# Patient Record
Sex: FEMALE | Race: WHITE | NOT HISPANIC OR LATINO | Employment: OTHER | URBAN - NONMETROPOLITAN AREA
[De-identification: names, ages, dates, MRNs, and addresses within clinical notes are randomized per-mention and may not be internally consistent; named-entity substitution may affect disease eponyms.]

---

## 2020-02-14 ENCOUNTER — HOSPITAL ENCOUNTER (OUTPATIENT)
Dept: MAMMOGRAPHY | Facility: HOSPITAL | Age: 65
Discharge: HOME | End: 2020-02-14
Admitting: FAMILY MEDICINE

## 2020-02-14 DIAGNOSIS — Z12.31 VISIT FOR SCREENING MAMMOGRAM: ICD-10-CM

## 2020-02-14 PROCEDURE — 77063 BREAST TOMOSYNTHESIS BI: CPT | Performed by: RADIOLOGY

## 2020-02-14 PROCEDURE — 77067 SCR MAMMO BI INCL CAD: CPT | Performed by: RADIOLOGY

## 2020-02-14 PROCEDURE — 77063 BREAST TOMOSYNTHESIS BI: CPT

## 2020-02-14 PROCEDURE — 77067 SCR MAMMO BI INCL CAD: CPT

## 2020-07-21 ENCOUNTER — TRANSCRIBE ORDERS (OUTPATIENT)
Dept: LAB | Facility: HOSPITAL | Age: 65
End: 2020-07-21

## 2020-07-21 DIAGNOSIS — Z01.818 PRE-OPERATIVE CLEARANCE: Primary | ICD-10-CM

## 2020-07-22 ENCOUNTER — LAB (OUTPATIENT)
Dept: LAB | Facility: HOSPITAL | Age: 65
End: 2020-07-22

## 2020-07-22 DIAGNOSIS — Z01.818 PRE-OPERATIVE CLEARANCE: ICD-10-CM

## 2020-07-22 PROCEDURE — C9803 HOPD COVID-19 SPEC COLLECT: HCPCS

## 2020-07-22 PROCEDURE — U0004 COV-19 TEST NON-CDC HGH THRU: HCPCS

## 2020-07-22 PROCEDURE — U0002 COVID-19 LAB TEST NON-CDC: HCPCS

## 2020-07-23 LAB
REF LAB TEST METHOD: NORMAL
SARS-COV-2 RNA RESP QL NAA+PROBE: NOT DETECTED

## 2021-03-01 ENCOUNTER — IMMUNIZATION (OUTPATIENT)
Dept: VACCINE CLINIC | Facility: HOSPITAL | Age: 66
End: 2021-03-01

## 2021-03-01 PROCEDURE — 0001A: CPT | Performed by: INTERNAL MEDICINE

## 2021-03-01 PROCEDURE — 91300 HC SARSCOV02 VAC 30MCG/0.3ML IM: CPT | Performed by: INTERNAL MEDICINE

## 2021-03-22 ENCOUNTER — IMMUNIZATION (OUTPATIENT)
Dept: VACCINE CLINIC | Facility: HOSPITAL | Age: 66
End: 2021-03-22

## 2021-03-22 PROCEDURE — 91300 HC SARSCOV02 VAC 30MCG/0.3ML IM: CPT | Performed by: INTERNAL MEDICINE

## 2021-03-22 PROCEDURE — 0002A: CPT | Performed by: INTERNAL MEDICINE

## 2021-09-14 ENCOUNTER — HOSPITAL ENCOUNTER (OUTPATIENT)
Dept: MAMMOGRAPHY | Facility: HOSPITAL | Age: 66
Discharge: HOME | End: 2021-09-14
Admitting: NURSE PRACTITIONER

## 2021-09-14 DIAGNOSIS — Z12.31 VISIT FOR SCREENING MAMMOGRAM: ICD-10-CM

## 2021-09-14 PROCEDURE — 77063 BREAST TOMOSYNTHESIS BI: CPT | Performed by: RADIOLOGY

## 2021-09-14 PROCEDURE — 77063 BREAST TOMOSYNTHESIS BI: CPT

## 2021-09-14 PROCEDURE — 77067 SCR MAMMO BI INCL CAD: CPT | Performed by: RADIOLOGY

## 2021-09-14 PROCEDURE — 77067 SCR MAMMO BI INCL CAD: CPT

## 2021-09-16 ENCOUNTER — HOSPITAL ENCOUNTER (OUTPATIENT)
Dept: MAMMOGRAPHY | Facility: HOSPITAL | Age: 66
Discharge: HOME | End: 2021-09-16
Admitting: RADIOLOGY

## 2021-09-16 DIAGNOSIS — R92.8 ABNORMAL MAMMOGRAM: ICD-10-CM

## 2021-09-16 PROCEDURE — 77065 DX MAMMO INCL CAD UNI: CPT | Performed by: RADIOLOGY

## 2021-09-16 PROCEDURE — 77065 DX MAMMO INCL CAD UNI: CPT

## 2021-09-22 ENCOUNTER — HOSPITAL ENCOUNTER (OUTPATIENT)
Dept: MAMMOGRAPHY | Facility: HOSPITAL | Age: 66
Discharge: HOME | End: 2021-09-22

## 2021-09-22 DIAGNOSIS — R92.8 ABNORMAL MAMMOGRAM: ICD-10-CM

## 2021-09-22 PROCEDURE — A4648 IMPLANTABLE TISSUE MARKER: HCPCS

## 2021-09-22 PROCEDURE — 19081 BX BREAST 1ST LESION STRTCTC: CPT | Performed by: RADIOLOGY

## 2021-09-22 PROCEDURE — 77065 DX MAMMO INCL CAD UNI: CPT | Performed by: RADIOLOGY

## 2021-09-24 LAB — LAB AP CASE REPORT: NORMAL

## 2021-09-27 ENCOUNTER — TELEPHONE (OUTPATIENT)
Dept: MAMMOGRAPHY | Facility: HOSPITAL | Age: 66
End: 2021-09-27

## 2021-09-27 NOTE — TELEPHONE ENCOUNTER
Pt given biopsy results and surgical consultation appt. Nurse navigator notified.    ----- Message from Elena Romero MD sent at 9/24/2021  1:53 PM EDT -----  PATHOLOGY:Ductal carcinoma in situ. Microcalcifications identified in association with ductal carcinoma in situ.The pathology result is concordant with the imaging findings.Recommend surgical consultation.The patient will be notified of the results and recommendations by our breast care nurse.

## 2021-10-01 ENCOUNTER — OFFICE VISIT (OUTPATIENT)
Dept: SURGERY | Facility: CLINIC | Age: 66
End: 2021-10-01

## 2021-10-01 VITALS
DIASTOLIC BLOOD PRESSURE: 62 MMHG | SYSTOLIC BLOOD PRESSURE: 118 MMHG | HEIGHT: 65 IN | HEART RATE: 60 BPM | WEIGHT: 141 LBS | BODY MASS INDEX: 23.49 KG/M2

## 2021-10-01 DIAGNOSIS — D05.12 BREAST NEOPLASM, TIS (DCIS), LEFT: Primary | ICD-10-CM

## 2021-10-01 PROCEDURE — 99204 OFFICE O/P NEW MOD 45 MIN: CPT | Performed by: SURGERY

## 2021-10-01 RX ORDER — AMLODIPINE BESYLATE 5 MG/1
5 TABLET ORAL DAILY
COMMUNITY
Start: 2021-08-03

## 2021-10-01 RX ORDER — CEFAZOLIN SODIUM 2 G/50ML
2 SOLUTION INTRAVENOUS ONCE
Status: CANCELLED | OUTPATIENT
Start: 2021-10-12 | End: 2021-10-01

## 2021-10-01 RX ORDER — OMEPRAZOLE 20 MG/1
20 CAPSULE, DELAYED RELEASE ORAL DAILY
COMMUNITY
End: 2023-01-09

## 2021-10-01 RX ORDER — CHLORAL HYDRATE 500 MG
CAPSULE ORAL
COMMUNITY

## 2021-10-01 RX ORDER — ERGOCALCIFEROL (VITAMIN D2) 10 MCG
400 TABLET ORAL DAILY
COMMUNITY

## 2021-10-01 NOTE — PROGRESS NOTES
"Subjective   Dodie Kelly is a 65 y.o. female here today for pathology review referred by Dr. Romero.    History of Present Illness  Ms. Kelly was seen in the office today for her new diagnosis of DCIS of the left breast.  Patient underwent a screening mammogram on 9/14/2021 which demonstrated left breast calcifications.  Diagnostic mammogram was performed on 9/16/2021.  Calcifications were felt to be indeterminant.  Biopsy was performed on 9/22/2021 which demonstrated cribriform DCIS, ER positive, [place] positive.  Patient denies a palpable mass or nipple discharge.  There is no prior history of breast biopsy or cyst aspiration.  There is no family history of breast or ovarian cancer.  The patient is postmenopausal and has never been on hormone replacement therapy.  Allergies   Allergen Reactions   • Bactrim [Sulfamethoxazole-Trimethoprim] Other (See Comments)     Passed out   • Lisinopril Cough   • Losartan Rash     Current Outpatient Medications   Medication Sig Dispense Refill   • amLODIPine (NORVASC) 5 MG tablet Take 5 mg by mouth Daily.     • Omega-3 Fatty Acids (fish oil) 1000 MG capsule capsule Take  by mouth Daily With Breakfast.     • omeprazole (priLOSEC) 20 MG capsule Take 20 mg by mouth Daily.     • Vitamin D, Cholecalciferol, (CHOLECALCIFEROL) 10 MCG (400 UNIT) tablet Take 400 Units by mouth Daily.       No current facility-administered medications for this visit.     Past Medical History:   Diagnosis Date   • Hypertension      History reviewed. No pertinent surgical history.    Pertinent Review of Systems:  Respiratory: no shortness of breath  Cardiovascular: no chest pain  Other pertinent:      Objective   /62 (BP Location: Left arm)   Pulse 60   Ht 165.1 cm (65\")   Wt 64 kg (141 lb)   BMI 23.46 kg/m²   Physical Exam  Well-developed well-nourished female no acute distress  Neck:  No supraclavicular adenopathy  Lungs:  Respiratory effort normal. Auscultation: Clear, without wheezes, " rhonchi, rales  Heart:  Regular rate and rhythm, without murmur, gallop, rub.  No pedal edema  Breasts: On visual inspection the breasts are symmetrical.  Examination of the right breast demonstrates no discrete mass, skin change, or axillary adenopathy.  Examination of the left breast demonstrates some bruising and a hematoma at the biopsy site.  The remainder of the left breast and axilla are unremarkable..       Procedures   Baseline L dex was performed and was -5.9    Results/Data:  Imaging: Mammogram reports and images from 9/14/2021, 9/16/2021 and 9/22/2021 were reviewed.  I agree with the assessment  Notes:   Lab:   Other: Pathology report was reviewed.  I agree with the assessment    Assessment/Plan   DCIS of the left breast, ER positive, [place] positive    I have asked pathology to quantify the size of the DCIS noted on biopsy  Proceed with left lumpectomy with wire localization         Discussion/Summary: It was discussed with the patient that invasive tumor may be identified at the time of lumpectomy which may alter further recommendations.  It was also discussed with the patient that she would likely need adjuvant radiation.  The role of hormonal therapy was also discussed.    Time spent:     Patient's Body mass index is 23.46 kg/m². indicating that she is within normal range (BMI 18.5-24.9). No BMI management plan needed..       Future Appointments   Date Time Provider Department Center   10/8/2021  9:30 AM PAT 3 [facility]         Please note that portions of this note were completed with a voice recognition program.

## 2021-10-01 NOTE — H&P
"Subjective   Dodie Kelly is a 65 y.o. female here today for pathology review referred by Dr. Romero.    History of Present Illness  Ms. Kelly was seen in the office today for her new diagnosis of DCIS of the left breast.  Patient underwent a screening mammogram on 9/14/2021 which demonstrated left breast calcifications.  Diagnostic mammogram was performed on 9/16/2021.  Calcifications were felt to be indeterminant.  Biopsy was performed on 9/22/2021 which demonstrated cribriform DCIS, ER positive, [place] positive.  Patient denies a palpable mass or nipple discharge.  There is no prior history of breast biopsy or cyst aspiration.  There is no family history of breast or ovarian cancer.  The patient is postmenopausal and has never been on hormone replacement therapy.  Allergies   Allergen Reactions   • Bactrim [Sulfamethoxazole-Trimethoprim] Other (See Comments)     Passed out   • Lisinopril Cough   • Losartan Rash     Current Outpatient Medications   Medication Sig Dispense Refill   • amLODIPine (NORVASC) 5 MG tablet Take 5 mg by mouth Daily.     • Omega-3 Fatty Acids (fish oil) 1000 MG capsule capsule Take  by mouth Daily With Breakfast.     • omeprazole (priLOSEC) 20 MG capsule Take 20 mg by mouth Daily.     • Vitamin D, Cholecalciferol, (CHOLECALCIFEROL) 10 MCG (400 UNIT) tablet Take 400 Units by mouth Daily.       No current facility-administered medications for this visit.     Past Medical History:   Diagnosis Date   • Hypertension      History reviewed. No pertinent surgical history.    Pertinent Review of Systems:  Respiratory: no shortness of breath  Cardiovascular: no chest pain  Other pertinent:      Objective   /62 (BP Location: Left arm)   Pulse 60   Ht 165.1 cm (65\")   Wt 64 kg (141 lb)   BMI 23.46 kg/m²   Physical Exam  Well-developed well-nourished female no acute distress  Neck:  No supraclavicular adenopathy  Lungs:  Respiratory effort normal. Auscultation: Clear, without wheezes, " rhonchi, rales  Heart:  Regular rate and rhythm, without murmur, gallop, rub.  No pedal edema  Breasts: On visual inspection the breasts are symmetrical.  Examination of the right breast demonstrates no discrete mass, skin change, or axillary adenopathy.  Examination of the left breast demonstrates some bruising and a hematoma at the biopsy site.  The remainder of the left breast and axilla are unremarkable..       Procedures   Baseline L dex was performed and was -5.9    Results/Data:  Imaging: Mammogram reports and images from 9/14/2021, 9/16/2021 and 9/22/2021 were reviewed.  I agree with the assessment  Notes:   Lab:   Other: Pathology report was reviewed.  I agree with the assessment    Assessment/Plan   DCIS of the left breast, ER positive, [place] positive    I have asked pathology to quantify the size of the DCIS noted on biopsy  Proceed with left lumpectomy with wire localization         Discussion/Summary: It was discussed with the patient that invasive tumor may be identified at the time of lumpectomy which may alter further recommendations.  It was also discussed with the patient that she would likely need adjuvant radiation.  The role of hormonal therapy was also discussed.    Time spent:     Patient's Body mass index is 23.46 kg/m². indicating that she is within normal range (BMI 18.5-24.9). No BMI management plan needed..       Future Appointments   Date Time Provider Department Center   10/8/2021  9:30 AM PAT 3 [facility]         Please note that portions of this note were completed with a voice recognition program.    This document has been electronically signed by Estefania ALMODOVAR MD on October 1, 2021 14:51 EDT

## 2021-10-05 DIAGNOSIS — Z01.818 PRE-OP TESTING: Primary | ICD-10-CM

## 2021-10-06 ENCOUNTER — TELEPHONE (OUTPATIENT)
Dept: SURGERY | Facility: CLINIC | Age: 66
End: 2021-10-06

## 2021-10-06 NOTE — DISCHARGE INSTRUCTIONS
10/12/21   ARRIVAL TIME PER DR TUTTLE OFFICE    TAKE the following medications the morning of surgery:  All heart or blood pressure medications    HOLD all diabetic medications the morning of surgery as ordered by physician.    Please discontinue all blood thinners and anticoagulants (except aspirin) prior to surgery as per your surgeon and cardiologist instructions.  Aspirin may be continued up to the day prior to surgery.     CHLORHEXIDINE CLOTHS GIVEN WITH INSTRUCTIONS AND FORM TO RETURN TO HOSPITAL, IF APPLICABLE.    General Instructions:  · Do not eat or drink after midnight: includes water, mints, or gum. You may brush your teeth.  Dental appliances that are removable must be taken out day of surgery.  · Do not smoke, chew tobacco, or drink alcohol.  · Bring medications in original bottles, any inhalers and if applicable your C-PAP/BI-PAP machine.  · Bring any papers given to you in the doctor's office.  · Wear clean comfortable clothes and socks.  · Do not wear contact lenses or make-up. Bring a case for your glasses if applicable.  · Bring crutches or walker if applicable.  · Leave all other valuables and jewelry at home.    If you were given a blood bank ID arm band remember to bring it with you the day of surgery.    Preventing a Surgical Site Infection:  Shower the night before surgery (unless instructed other wise) using a fresh bar of anti-bacterial soap (such as Dial) and clean washcloth. Dry with a clean towel and dress in clean clothing.  For 2 to 3 days before surgery, avoid shaving with a razor near where you will have surgery because the razor can irritate skin and make it easier to develop an infection. Ask your surgeon if you will be receiving antibiotics prior to surgery.  Make sure you, your family, and all healthcare providers clear their hands with soap and water or an alcohol-based hand  before caring for you or your wound.  If at all possible, quit smoking as many days before  surgery as you can.    Day of surgery:  Upon arrival, a Pre-op nurse and Anesthesiologist will review your health history, obtain vital signs, and answer questions you may have. The only belongings needed at this time will be your home medications and if applicable your C-PAP/BI-PAP machine. If you are staying overnight your family can leave the rest of your belongings in the car and bring them to your room later. A Pre-op nurse will start an IV and you may receive medication in preparation for surgery, including something to help you relax. Your family will be able to see you in the Pre-op area. While you are in surgery your family should notify the waiting room  if they leave the waiting room area and provide a contact phone number.    Please be aware that surgery does come with discomfort. We want to make every effort to control your discomfort so please discuss any uncontrolled symptoms with your nurse. Your doctor will most likely have prescribed pain medications.    If you are going home after surgery you will receive individualized written care instructions before being discharged. A responsible adult must drive you to and from the hospital on the day of surgery and stay with you for 24 hours.    If you are staying overnight following surgery, you will be transported to your hospital room following the recovery period.  [facility] has all private rooms.    If you have any questions please call Pre-Admission Testing at [phone].  Deductibles and co-payments are collected on the day of service. Please be prepared to pay the required co-pay, deductible or deposit on the day of service as defined by your plan.    A RESPONSIBLE PERSON MUST REMAIN IN THE WAITING ROOM DURING YOUR PROCEDURE AND A RESPONSIBLE  MUST BE AVAILABLE UPON YOUR DISCHARGE.

## 2021-10-08 ENCOUNTER — PRE-ADMISSION TESTING (OUTPATIENT)
Dept: PREADMISSION TESTING | Facility: HOSPITAL | Age: 66
End: 2021-10-08

## 2021-10-08 ENCOUNTER — LAB (OUTPATIENT)
Dept: LAB | Facility: HOSPITAL | Age: 66
End: 2021-10-08

## 2021-10-08 DIAGNOSIS — D05.12 BREAST NEOPLASM, TIS (DCIS), LEFT: ICD-10-CM

## 2021-10-08 DIAGNOSIS — Z01.818 PRE-OP TESTING: ICD-10-CM

## 2021-10-08 LAB
ANION GAP SERPL CALCULATED.3IONS-SCNC: 10.6 MMOL/L (ref 5–15)
BASOPHILS # BLD AUTO: 0.03 10*3/MM3 (ref 0–0.2)
BASOPHILS NFR BLD AUTO: 0.5 % (ref 0–1.5)
BUN SERPL-MCNC: 18 MG/DL (ref 8–23)
BUN/CREAT SERPL: 23.1 (ref 7–25)
CALCIUM SPEC-SCNC: 9.9 MG/DL (ref 8.6–10.5)
CHLORIDE SERPL-SCNC: 104 MMOL/L (ref 98–107)
CO2 SERPL-SCNC: 28.4 MMOL/L (ref 22–29)
CREAT SERPL-MCNC: 0.78 MG/DL (ref 0.57–1)
DEPRECATED RDW RBC AUTO: 43.6 FL (ref 37–54)
EOSINOPHIL # BLD AUTO: 0.15 10*3/MM3 (ref 0–0.4)
EOSINOPHIL NFR BLD AUTO: 2.4 % (ref 0.3–6.2)
ERYTHROCYTE [DISTWIDTH] IN BLOOD BY AUTOMATED COUNT: 13.1 % (ref 12.3–15.4)
GFR SERPL CREATININE-BSD FRML MDRD: 74 ML/MIN/1.73
GLUCOSE SERPL-MCNC: 88 MG/DL (ref 65–99)
HCT VFR BLD AUTO: 41.6 % (ref 34–46.6)
HGB BLD-MCNC: 13.4 G/DL (ref 12–15.9)
IMM GRANULOCYTES # BLD AUTO: 0.02 10*3/MM3 (ref 0–0.05)
IMM GRANULOCYTES NFR BLD AUTO: 0.3 % (ref 0–0.5)
LYMPHOCYTES # BLD AUTO: 1.31 10*3/MM3 (ref 0.7–3.1)
LYMPHOCYTES NFR BLD AUTO: 20.7 % (ref 19.6–45.3)
MCH RBC QN AUTO: 29.3 PG (ref 26.6–33)
MCHC RBC AUTO-ENTMCNC: 32.2 G/DL (ref 31.5–35.7)
MCV RBC AUTO: 90.8 FL (ref 79–97)
MONOCYTES # BLD AUTO: 0.5 10*3/MM3 (ref 0.1–0.9)
MONOCYTES NFR BLD AUTO: 7.9 % (ref 5–12)
NEUTROPHILS NFR BLD AUTO: 4.33 10*3/MM3 (ref 1.7–7)
NEUTROPHILS NFR BLD AUTO: 68.2 % (ref 42.7–76)
NRBC BLD AUTO-RTO: 0 /100 WBC (ref 0–0.2)
PLATELET # BLD AUTO: 233 10*3/MM3 (ref 140–450)
PMV BLD AUTO: 10.5 FL (ref 6–12)
POTASSIUM SERPL-SCNC: 4 MMOL/L (ref 3.5–5.2)
QT INTERVAL: 410 MS
QTC INTERVAL: 429 MS
RBC # BLD AUTO: 4.58 10*6/MM3 (ref 3.77–5.28)
SARS-COV-2 RNA PNL SPEC NAA+PROBE: NOT DETECTED
SODIUM SERPL-SCNC: 143 MMOL/L (ref 136–145)
WBC # BLD AUTO: 6.34 10*3/MM3 (ref 3.4–10.8)

## 2021-10-08 PROCEDURE — 93005 ELECTROCARDIOGRAM TRACING: CPT

## 2021-10-08 PROCEDURE — U0005 INFEC AGEN DETEC AMPLI PROBE: HCPCS | Performed by: SURGERY

## 2021-10-08 PROCEDURE — U0004 COV-19 TEST NON-CDC HGH THRU: HCPCS | Performed by: SURGERY

## 2021-10-08 PROCEDURE — 80048 BASIC METABOLIC PNL TOTAL CA: CPT

## 2021-10-08 PROCEDURE — C9803 HOPD COVID-19 SPEC COLLECT: HCPCS

## 2021-10-08 PROCEDURE — 93010 ELECTROCARDIOGRAM REPORT: CPT | Performed by: INTERNAL MEDICINE

## 2021-10-08 PROCEDURE — 85025 COMPLETE CBC W/AUTO DIFF WBC: CPT

## 2021-10-08 PROCEDURE — 36415 COLL VENOUS BLD VENIPUNCTURE: CPT

## 2021-10-11 ENCOUNTER — TELEPHONE (OUTPATIENT)
Dept: SURGERY | Facility: CLINIC | Age: 66
End: 2021-10-11

## 2021-10-12 ENCOUNTER — HOSPITAL ENCOUNTER (OUTPATIENT)
Dept: MAMMOGRAPHY | Facility: HOSPITAL | Age: 66
Discharge: HOME | End: 2021-10-12

## 2021-10-12 ENCOUNTER — HOSPITAL ENCOUNTER (OUTPATIENT)
Facility: HOSPITAL | Age: 66
Setting detail: HOSPITAL OUTPATIENT SURGERY
Discharge: HOME | End: 2021-10-12
Attending: SURGERY | Admitting: SURGERY

## 2021-10-12 ENCOUNTER — ANESTHESIA (OUTPATIENT)
Dept: PERIOP | Facility: HOSPITAL | Age: 66
End: 2021-10-12

## 2021-10-12 ENCOUNTER — ANESTHESIA EVENT (OUTPATIENT)
Dept: PERIOP | Facility: HOSPITAL | Age: 66
End: 2021-10-12

## 2021-10-12 VITALS
HEART RATE: 66 BPM | OXYGEN SATURATION: 99 % | BODY MASS INDEX: 23.39 KG/M2 | DIASTOLIC BLOOD PRESSURE: 68 MMHG | WEIGHT: 140.4 LBS | RESPIRATION RATE: 16 BRPM | SYSTOLIC BLOOD PRESSURE: 128 MMHG | TEMPERATURE: 98 F | HEIGHT: 65 IN

## 2021-10-12 DIAGNOSIS — D05.12 BREAST NEOPLASM, TIS (DCIS), LEFT: ICD-10-CM

## 2021-10-12 PROCEDURE — 25010000002 PROPOFOL 10 MG/ML EMULSION: Performed by: NURSE ANESTHETIST, CERTIFIED REGISTERED

## 2021-10-12 PROCEDURE — 25010000002 KETOROLAC TROMETHAMINE PER 15 MG: Performed by: NURSE ANESTHETIST, CERTIFIED REGISTERED

## 2021-10-12 PROCEDURE — 25010000003 CEFAZOLIN SODIUM-DEXTROSE 2-3 GM-%(50ML) RECONSTITUTED SOLUTION: Performed by: SURGERY

## 2021-10-12 PROCEDURE — 25010000002 ONDANSETRON PER 1 MG: Performed by: NURSE ANESTHETIST, CERTIFIED REGISTERED

## 2021-10-12 PROCEDURE — 76098 X-RAY EXAM SURGICAL SPECIMEN: CPT | Performed by: SURGERY

## 2021-10-12 PROCEDURE — C1819 TISSUE LOCALIZATION-EXCISION: HCPCS

## 2021-10-12 PROCEDURE — 19301 PARTIAL MASTECTOMY: CPT | Performed by: SURGERY

## 2021-10-12 PROCEDURE — 25010000002 MIDAZOLAM PER 1 MG: Performed by: NURSE ANESTHETIST, CERTIFIED REGISTERED

## 2021-10-12 PROCEDURE — 25010000002 FENTANYL CITRATE (PF) 50 MCG/ML SOLUTION: Performed by: NURSE ANESTHETIST, CERTIFIED REGISTERED

## 2021-10-12 PROCEDURE — 19281 PERQ DEVICE BREAST 1ST IMAG: CPT | Performed by: RADIOLOGY

## 2021-10-12 PROCEDURE — 88307 TISSUE EXAM BY PATHOLOGIST: CPT | Performed by: SURGERY

## 2021-10-12 RX ORDER — FAMOTIDINE 10 MG/ML
INJECTION, SOLUTION INTRAVENOUS AS NEEDED
Status: DISCONTINUED | OUTPATIENT
Start: 2021-10-12 | End: 2021-10-12 | Stop reason: SURG

## 2021-10-12 RX ORDER — MIDAZOLAM HYDROCHLORIDE 1 MG/ML
INJECTION INTRAMUSCULAR; INTRAVENOUS AS NEEDED
Status: DISCONTINUED | OUTPATIENT
Start: 2021-10-12 | End: 2021-10-12

## 2021-10-12 RX ORDER — HYDROCODONE BITARTRATE AND ACETAMINOPHEN 7.5; 325 MG/1; MG/1
1 TABLET ORAL 4 TIMES DAILY PRN
Qty: 8 TABLET | Refills: 0 | Status: SHIPPED | OUTPATIENT
Start: 2021-10-12

## 2021-10-12 RX ORDER — SODIUM CHLORIDE 0.9 % (FLUSH) 0.9 %
10 SYRINGE (ML) INJECTION AS NEEDED
Status: DISCONTINUED | OUTPATIENT
Start: 2021-10-12 | End: 2021-10-12 | Stop reason: HOSPADM

## 2021-10-12 RX ORDER — HYDROCODONE BITARTRATE AND ACETAMINOPHEN 5; 325 MG/1; MG/1
1 TABLET ORAL ONCE AS NEEDED
Status: DISCONTINUED | OUTPATIENT
Start: 2021-10-12 | End: 2021-10-12 | Stop reason: HOSPADM

## 2021-10-12 RX ORDER — KETOROLAC TROMETHAMINE 30 MG/ML
INJECTION, SOLUTION INTRAMUSCULAR; INTRAVENOUS AS NEEDED
Status: DISCONTINUED | OUTPATIENT
Start: 2021-10-12 | End: 2021-10-12 | Stop reason: SURG

## 2021-10-12 RX ORDER — MIDAZOLAM HYDROCHLORIDE 1 MG/ML
0.5 INJECTION INTRAMUSCULAR; INTRAVENOUS
Status: DISCONTINUED | OUTPATIENT
Start: 2021-10-12 | End: 2021-10-12 | Stop reason: HOSPADM

## 2021-10-12 RX ORDER — SODIUM CHLORIDE, SODIUM LACTATE, POTASSIUM CHLORIDE, CALCIUM CHLORIDE 600; 310; 30; 20 MG/100ML; MG/100ML; MG/100ML; MG/100ML
125 INJECTION, SOLUTION INTRAVENOUS ONCE
Status: COMPLETED | OUTPATIENT
Start: 2021-10-12 | End: 2021-10-12

## 2021-10-12 RX ORDER — BUPIVACAINE HYDROCHLORIDE AND EPINEPHRINE 5; 5 MG/ML; UG/ML
INJECTION, SOLUTION EPIDURAL; INTRACAUDAL; PERINEURAL AS NEEDED
Status: DISCONTINUED | OUTPATIENT
Start: 2021-10-12 | End: 2021-10-12 | Stop reason: HOSPADM

## 2021-10-12 RX ORDER — PROPOFOL 10 MG/ML
VIAL (ML) INTRAVENOUS AS NEEDED
Status: DISCONTINUED | OUTPATIENT
Start: 2021-10-12 | End: 2021-10-12 | Stop reason: SURG

## 2021-10-12 RX ORDER — IPRATROPIUM BROMIDE AND ALBUTEROL SULFATE 2.5; .5 MG/3ML; MG/3ML
3 SOLUTION RESPIRATORY (INHALATION) ONCE AS NEEDED
Status: DISCONTINUED | OUTPATIENT
Start: 2021-10-12 | End: 2021-10-12 | Stop reason: HOSPADM

## 2021-10-12 RX ORDER — ONDANSETRON 2 MG/ML
INJECTION INTRAMUSCULAR; INTRAVENOUS AS NEEDED
Status: DISCONTINUED | OUTPATIENT
Start: 2021-10-12 | End: 2021-10-12 | Stop reason: SURG

## 2021-10-12 RX ORDER — CEFAZOLIN SODIUM 2 G/50ML
2 SOLUTION INTRAVENOUS ONCE
Status: COMPLETED | OUTPATIENT
Start: 2021-10-12 | End: 2021-10-12

## 2021-10-12 RX ORDER — SODIUM CHLORIDE 0.9 % (FLUSH) 0.9 %
10 SYRINGE (ML) INJECTION EVERY 12 HOURS SCHEDULED
Status: DISCONTINUED | OUTPATIENT
Start: 2021-10-12 | End: 2021-10-12 | Stop reason: HOSPADM

## 2021-10-12 RX ORDER — FENTANYL CITRATE 50 UG/ML
INJECTION, SOLUTION INTRAMUSCULAR; INTRAVENOUS AS NEEDED
Status: DISCONTINUED | OUTPATIENT
Start: 2021-10-12 | End: 2021-10-12 | Stop reason: SURG

## 2021-10-12 RX ORDER — MEPERIDINE HYDROCHLORIDE 25 MG/ML
12.5 INJECTION INTRAMUSCULAR; INTRAVENOUS; SUBCUTANEOUS
Status: DISCONTINUED | OUTPATIENT
Start: 2021-10-12 | End: 2021-10-12 | Stop reason: HOSPADM

## 2021-10-12 RX ORDER — FENTANYL CITRATE 50 UG/ML
50 INJECTION, SOLUTION INTRAMUSCULAR; INTRAVENOUS
Status: DISCONTINUED | OUTPATIENT
Start: 2021-10-12 | End: 2021-10-12 | Stop reason: HOSPADM

## 2021-10-12 RX ORDER — MAGNESIUM HYDROXIDE 1200 MG/15ML
LIQUID ORAL AS NEEDED
Status: DISCONTINUED | OUTPATIENT
Start: 2021-10-12 | End: 2021-10-12 | Stop reason: HOSPADM

## 2021-10-12 RX ORDER — ONDANSETRON 2 MG/ML
4 INJECTION INTRAMUSCULAR; INTRAVENOUS AS NEEDED
Status: DISCONTINUED | OUTPATIENT
Start: 2021-10-12 | End: 2021-10-12 | Stop reason: HOSPADM

## 2021-10-12 RX ORDER — MIDAZOLAM HYDROCHLORIDE 1 MG/ML
INJECTION INTRAMUSCULAR; INTRAVENOUS AS NEEDED
Status: DISCONTINUED | OUTPATIENT
Start: 2021-10-12 | End: 2021-10-12 | Stop reason: SURG

## 2021-10-12 RX ADMIN — KETOROLAC TROMETHAMINE 30 MG: 30 INJECTION, SOLUTION INTRAMUSCULAR at 13:20

## 2021-10-12 RX ADMIN — ONDANSETRON 4 MG: 2 INJECTION INTRAMUSCULAR; INTRAVENOUS at 12:32

## 2021-10-12 RX ADMIN — FENTANYL CITRATE 100 MCG: 50 INJECTION INTRAMUSCULAR; INTRAVENOUS at 12:32

## 2021-10-12 RX ADMIN — SODIUM CHLORIDE, POTASSIUM CHLORIDE, SODIUM LACTATE AND CALCIUM CHLORIDE 125 ML/HR: 600; 310; 30; 20 INJECTION, SOLUTION INTRAVENOUS at 10:17

## 2021-10-12 RX ADMIN — FAMOTIDINE 20 MG: 10 INJECTION INTRAVENOUS at 12:32

## 2021-10-12 RX ADMIN — EPHEDRINE SULFATE 10 MG: 50 INJECTION, SOLUTION INTRAVENOUS at 12:49

## 2021-10-12 RX ADMIN — PROPOFOL 150 MG: 10 INJECTION, EMULSION INTRAVENOUS at 12:32

## 2021-10-12 RX ADMIN — SODIUM CHLORIDE, POTASSIUM CHLORIDE, SODIUM LACTATE AND CALCIUM CHLORIDE: 600; 310; 30; 20 INJECTION, SOLUTION INTRAVENOUS at 12:27

## 2021-10-12 RX ADMIN — CEFAZOLIN SODIUM 2 G: 2 SOLUTION INTRAVENOUS at 12:27

## 2021-10-12 RX ADMIN — MIDAZOLAM 2 MG: 1 INJECTION INTRAMUSCULAR; INTRAVENOUS at 12:27

## 2021-10-12 NOTE — ANESTHESIA POSTPROCEDURE EVALUATION
Patient: Dodie Kelly    Procedure Summary     Date: 10/12/21 Room / Location: [facility] OR 01 /  [facility] OR    Anesthesia Start: 1227 Anesthesia Stop: 1340    Procedures:       BREAST BIOPSY WITH NEEDLE LOCALIZATION (Left Breast)      INTRAOPERATIVE ULTRASOUND (Left ) Diagnosis:       Breast neoplasm, Tis (DCIS), left      (Breast neoplasm, Tis (DCIS), left [D05.12])    Surgeons: Estefania Dias MD Provider: Rg Martinez MD    Anesthesia Type: general ASA Status: 2          Anesthesia Type: general    Vitals  No vitals data found for the desired time range.          Post Anesthesia Care and Evaluation    Patient location during evaluation: PHASE II  Patient participation: complete - patient participated  Level of consciousness: awake and alert  Pain score: 0  Pain management: adequate  Airway patency: patent  Anesthetic complications: No anesthetic complications    Cardiovascular status: acceptable  Respiratory status: acceptable  Hydration status: acceptable

## 2021-10-12 NOTE — ANESTHESIA PREPROCEDURE EVALUATION
Anesthesia Evaluation     no history of anesthetic complications:  NPO Solid Status: > 8 hours  NPO Liquid Status: > 8 hours           Airway   Mallampati: I  TM distance: >3 FB  Neck ROM: full  No difficulty expected  Dental - normal exam   (+) edentulous    Pulmonary - normal exam   Cardiovascular - normal exam    (+) hypertension,       Neuro/Psych  GI/Hepatic/Renal/Endo    (+)  GERD,      Musculoskeletal     Abdominal  - normal exam    Bowel sounds: normal.   Substance History      OB/GYN          Other                      Anesthesia Plan    ASA 2     general     intravenous induction     Anesthetic plan, all risks, benefits, and alternatives have been provided, discussed and informed consent has been obtained with: patient.

## 2021-10-12 NOTE — ANESTHESIA PROCEDURE NOTES
Airway  Urgency: elective    Date/Time: 10/12/2021 12:32 PM    General Information and Staff    Patient location during procedure: OR  CRNA: Yolie Zee CRNA    Indications and Patient Condition    Preoxygenated: yes      Final Airway Details  Final airway type: supraglottic airway      Successful airway: unique  Size 4    Number of attempts at approach: 1  Assessment: lips, teeth, and gum same as pre-op and atraumatic intubation

## 2021-10-18 LAB — LAB AP CASE REPORT: NORMAL

## 2021-10-21 ENCOUNTER — OFFICE VISIT (OUTPATIENT)
Dept: SURGERY | Facility: CLINIC | Age: 66
End: 2021-10-21

## 2021-10-21 VITALS
HEART RATE: 61 BPM | DIASTOLIC BLOOD PRESSURE: 84 MMHG | WEIGHT: 140.2 LBS | BODY MASS INDEX: 23.36 KG/M2 | SYSTOLIC BLOOD PRESSURE: 146 MMHG | HEIGHT: 65 IN

## 2021-10-21 DIAGNOSIS — D05.12 BREAST NEOPLASM, TIS (DCIS), LEFT: Primary | ICD-10-CM

## 2021-10-21 DIAGNOSIS — Z09 POSTOP CHECK: ICD-10-CM

## 2021-10-21 PROCEDURE — 99024 POSTOP FOLLOW-UP VISIT: CPT | Performed by: SURGERY

## 2021-10-21 NOTE — PROGRESS NOTES
"Subjective   Dodie Kelly is a 65 y.o. female here today for post op.    History of Present Illness  Ms. Kelly was seen in the office today for first postoperative visit following a left lumpectomy for DCIS on 10/12/2021.  Patient has mild soreness related to her incision site.  Allergies   Allergen Reactions   • Bactrim [Sulfamethoxazole-Trimethoprim] Other (See Comments)     Passed out   • Lisinopril Cough   • Losartan Rash         Current Outpatient Medications   Medication Sig Dispense Refill   • amLODIPine (NORVASC) 5 MG tablet Take 5 mg by mouth Daily.     • HYDROcodone-acetaminophen (Norco) 7.5-325 MG per tablet Take 1 tablet by mouth 4 (Four) Times a Day As Needed for Moderate Pain. 8 tablet 0   • Omega-3 Fatty Acids (fish oil) 1000 MG capsule capsule Take  by mouth Daily With Breakfast.     • omeprazole (priLOSEC) 20 MG capsule Take 20 mg by mouth Daily.     • Vitamin D, Cholecalciferol, (CHOLECALCIFEROL) 10 MCG (400 UNIT) tablet Take 400 Units by mouth Daily.       No current facility-administered medications for this visit.       Objective   /84 (BP Location: Left arm)   Pulse 61   Ht 165.1 cm (65\")   Wt 63.6 kg (140 lb 3.2 oz)   BMI 23.33 kg/m²    Physical Exam  Left breast: Healing surgical scar in the upper outer quadrant.  Mild postoperative change.  Results/Data  Pathology report demonstrated DCIS of maximal extent of 8 mm ER positive, [place] positive.  Margins of resection clear.    Procedures     Assessment/Plan   Left breast DCIS grade 1-2, ER positive, TN positive.  Status post lumpectomy with clear margins    Plan: Refer to radiation oncology for adjuvant radiation       Discussion/Summary: Patient states she is not willing to consider hormonal therapy.  She did voice some reluctance and having adjuvant radiation.  I did discuss the risks of recurrence with her.  The patient did agree that she would meet with radiation oncology.  I did advise the patient that if she ultimately decides " not to move forward with radiation to let me know so that I would know when to schedule her new baseline mammogram and follow-up.  Patient's Body mass index is 23.33 kg/m². indicating that she is within normal range (BMI 18.5-24.9). No BMI management plan needed..       No future appointments.      Please note that portions of this note were completed with a voice recognition program.

## 2021-11-10 ENCOUNTER — OFFICE VISIT (OUTPATIENT)
Dept: RADIATION ONCOLOGY | Facility: HOSPITAL | Age: 66
End: 2021-11-10

## 2021-11-10 ENCOUNTER — LAB (OUTPATIENT)
Dept: LAB | Facility: HOSPITAL | Age: 66
End: 2021-11-10

## 2021-11-10 ENCOUNTER — CONSULT (OUTPATIENT)
Dept: RADIATION ONCOLOGY | Facility: HOSPITAL | Age: 66
End: 2021-11-10

## 2021-11-10 VITALS
HEART RATE: 65 BPM | DIASTOLIC BLOOD PRESSURE: 80 MMHG | RESPIRATION RATE: 16 BRPM | HEIGHT: 65 IN | SYSTOLIC BLOOD PRESSURE: 154 MMHG | BODY MASS INDEX: 23.32 KG/M2 | OXYGEN SATURATION: 98 % | TEMPERATURE: 96.9 F | WEIGHT: 140 LBS

## 2021-11-10 DIAGNOSIS — D05.12 BREAST NEOPLASM, TIS (DCIS), LEFT: ICD-10-CM

## 2021-11-10 LAB — SARS-COV-2 RNA PNL SPEC NAA+PROBE: NOT DETECTED

## 2021-11-10 PROCEDURE — 99204 OFFICE O/P NEW MOD 45 MIN: CPT | Performed by: RADIOLOGY

## 2021-11-10 PROCEDURE — 77263 THER RADIOLOGY TX PLNG CPLX: CPT | Performed by: RADIOLOGY

## 2021-11-10 PROCEDURE — G0463 HOSPITAL OUTPT CLINIC VISIT: HCPCS | Performed by: RADIOLOGY

## 2021-11-10 PROCEDURE — U0005 INFEC AGEN DETEC AMPLI PROBE: HCPCS

## 2021-11-10 PROCEDURE — U0004 COV-19 TEST NON-CDC HGH THRU: HCPCS

## 2021-11-10 NOTE — PROGRESS NOTES
New Patient Office Consult      Patient Name: Dodie Kelly  : 1955   MRN: [id number]     Requesting Physician: Estefania Dias MD    Chief Complaint:    Chief Complaint   Patient presents with   • Breast Cancer     left      Staging: DCIS of the left breast, ER/VT Positive    History of Present Illness: Dodie Kelly is a pleasant 65 y.o. female who is here today for consultation regarding radiation therapy to the left breast. And to to discuss radiation therapy risks and benefits. She is accompanied today by her .     Mrs. Kelly underwent routine screening mammograms yearly.  At her yearly screening mammogram on 2021 the results revealed left breast calcifications and additional imaging was recommended.  Up to this point the patient had had no positive or suspicious mammograms in the past, or biopsies.  She also had never noted any palpable masses, discharge, or pain in the breasts up to this point.  On 2021 the patient underwent a diagnostic mammogram which revealed grouped forward amorphous calcifications in the left 1:00 to 2:00 region.  A biopsy was performed on 2021 which revealed DCIS, ER positive, VT positive.  Patient was referred to Dr. Dias and she was seen on 2021, she was scheduled for surgery on 2021.  She underwent a left lumpectomy, she did well with this procedure and healed well with no complications other than mild soreness at the incision site (which has now resolved).  Pathology after the lumpectomy determined that she had a grade 1-2 (low to intermediate risk), no lymph node involvement, and clear margins with the closest margin being 2 mm.      Subjective      Review of Systems:   Review of Systems   Constitutional: Negative.  Negative for activity change and fatigue.   HENT: Negative.  Negative for trouble swallowing.    Eyes: Negative.    Respiratory: Negative.  Negative for cough, chest  tightness and shortness of breath.    Cardiovascular: Negative.  Negative for chest pain.   Gastrointestinal: Negative.  Negative for nausea and vomiting.   Endocrine: Negative.    Genitourinary: Negative.  Negative for breast discharge, breast lump and breast pain.   Musculoskeletal: Negative.    Skin: Negative.  Negative for color change and dry skin.   Neurological: Negative.    Hematological: Negative.    Psychiatric/Behavioral: Negative.      Review of Systems   Constitutional: Negative.  Negative for activity change and fatigue.   HENT:  Negative.  Negative for trouble swallowing.    Eyes: Negative.    Respiratory: Negative.  Negative for chest tightness, cough and shortness of breath.    Cardiovascular: Negative.  Negative for chest pain.   Gastrointestinal: Negative.  Negative for nausea and vomiting.   Endocrine: Negative.    Genitourinary: Negative.     Musculoskeletal: Negative.    Skin: Negative.  Negative for color change.   Neurological: Negative.    Hematological: Negative.    Psychiatric/Behavioral: Negative.        I have reviewed and confirmed the accuracy of the ROS as documented by the MA/LPN/RN LAURI Pulido     Past Oncology History:   Oncology/Hematology History    No history exists.        Past Radiation History:  No previous exposures to ionizing radiation therapy and there are not relative contraindications including connective tissue disorder.     Past Medical History:   Past Medical History:   Diagnosis Date   • Breast cancer (HCC)    • Cancer (HCC)     breast   • GERD (gastroesophageal reflux disease)    • Hypertension        Past Surgical History:   Past Surgical History:   Procedure Laterality Date   • BREAST BIOPSY Left 10/12/2021    Procedure: BREAST BIOPSY WITH NEEDLE LOCALIZATION;  Surgeon: Estefania Dias MD;  Location: [facility];  Service: General;  Laterality: Left;   • BREAST LUMPECTOMY     • COLONOSCOPY         Family History:   Family History   Problem  Relation Age of Onset   • Hypertension Mother    • Heart disease Father    • Hypertension Sister    • Hypertension Daughter    • Cancer Maternal Grandmother         colon   • Cancer Brother    • Breast cancer Neg Hx        Social History:   Social History     Socioeconomic History   • Marital status:    Tobacco Use   • Smoking status: Never Smoker   • Smokeless tobacco: Never Used   Vaping Use   • Vaping Use: Never used   Substance and Sexual Activity   • Alcohol use: Never   • Drug use: Never   • Sexual activity: Defer       Medications:     Current Outpatient Medications:   •  amLODIPine (NORVASC) 5 MG tablet, Take 5 mg by mouth Daily., Disp: , Rfl:   •  Omega-3 Fatty Acids (fish oil) 1000 MG capsule capsule, Take  by mouth Daily With Breakfast., Disp: , Rfl:   •  omeprazole (priLOSEC) 20 MG capsule, Take 20 mg by mouth Daily., Disp: , Rfl:   •  Vitamin D, Cholecalciferol, (CHOLECALCIFEROL) 10 MCG (400 UNIT) tablet, Take 400 Units by mouth Daily., Disp: , Rfl:   •  HYDROcodone-acetaminophen (Norco) 7.5-325 MG per tablet, Take 1 tablet by mouth 4 (Four) Times a Day As Needed for Moderate Pain., Disp: 8 tablet, Rfl: 0    Allergies:   Allergies   Allergen Reactions   • Bactrim [Sulfamethoxazole-Trimethoprim] Other (See Comments)     Passed out   • Lisinopril Cough   • Losartan Rash       KPS: 90%     Results Review:   The following data was reviewed by: LAURI Pulido on 11/10/2021:  Common labs    Common Labsle 10/8/21 10/8/21    0943 0943   Glucose  88   BUN  18   Creatinine  0.78   eGFR Non African Am  74   Sodium  143   Potassium  4.0   Chloride  104   Calcium  9.9   WBC 6.34    Hemoglobin 13.4    Hematocrit 41.6    Platelets 233            Covid Tests    Common Labsle 10/8/21   COVID19 Not Detected             Imaging:    Screening Mammogram- 9/14/2021  FINDINGS:  The breasts are heterogeneously dense, which may obscure  small masses.     The bilateral fibroglandular pattern is stable in  appearance. No  dominant mass or area of architectural distortion is seen.  Calcifications are present in the left upper outer quadrant for which  additional imaging is recommended.     IMPRESSION:  1. Stable mammographic appearance of the right breast with no findings  suspicious for malignancy.  2. Calcifications in the left breast. Recommend additional imaging.        BI-RADS CATEGORY:  0, INCOMPLETE: Need additional imaging evaluation.     RECOMMENDATION:  Left ML and CC magnification views.      Diagnostic Mammogram- 9/16/2021  FINDINGS: Magnification imaging centered over the upper outer quadrant  of the left breast demonstrates a small group of amorphous  calcifications in the left 1:00 to 2:00 region. Recommend stereotactic  guided core biopsy.     IMPRESSION:  Grouped amorphous calcifications in the left 1:00 to 2:00  region.     BI-RADS CATEGORY:  4, SUSPICIOUS        Pathology:    9/22/2021          10/12/2021        Objective     Physical Exam:  Physical Exam  Vitals reviewed.   Constitutional:       General: She is not in acute distress.     Appearance: Normal appearance. She is normal weight. She is not ill-appearing.   HENT:      Head: Normocephalic.      Nose: Nose normal.      Mouth/Throat:      Mouth: Mucous membranes are moist.      Pharynx: Oropharynx is clear.   Eyes:      Extraocular Movements: Extraocular movements intact.      Conjunctiva/sclera: Conjunctivae normal.      Pupils: Pupils are equal, round, and reactive to light.   Cardiovascular:      Rate and Rhythm: Normal rate and regular rhythm.      Pulses: Normal pulses.      Heart sounds: Normal heart sounds.   Pulmonary:      Effort: Pulmonary effort is normal. No respiratory distress.      Breath sounds: Normal breath sounds.   Chest:   Breasts:      Right: Normal.      Left: Normal. No swelling, bleeding, mass, nipple discharge, skin change or tenderness.        Comments: Healed surgical scar noted  Abdominal:      General: Abdomen is  "flat. Bowel sounds are normal. There is no distension.      Palpations: Abdomen is soft. There is no mass.   Musculoskeletal:         General: No swelling or tenderness. Normal range of motion.      Cervical back: Normal range of motion.   Skin:     General: Skin is warm and dry.      Capillary Refill: Capillary refill takes less than 2 seconds.   Neurological:      General: No focal deficit present.      Mental Status: She is alert and oriented to person, place, and time. Mental status is at baseline.   Psychiatric:         Mood and Affect: Mood normal.         Behavior: Behavior normal.         Thought Content: Thought content normal.         Judgment: Judgment normal.         Vital Signs:   Vitals:    11/10/21 0847   BP: 154/80   Pulse: 65   Resp: 16   Temp: 96.9 °F (36.1 °C)   TempSrc: Temporal   SpO2: 98%   Weight: 63.5 kg (140 lb)   Height: 165.1 cm (65\")   PainSc: 0-No pain     Body mass index is 23.3 kg/m².       Assessment / Plan      Assessment/Plan:   Mrs. Kelly is a pleasant 65 year old who is here today s/p left lumpectomy with Dr. Dias. She was found to have left breast DCIS grade 1-2, ER positive, HI positive. No lymph node involvement, and clear margins with the closest margin being 2 mm. At this time she declined HRT, she thinks that this is not the right route for her.    Whole Breast radiation is indicated, which is what we offered her today. With hypofractionated WBI, patients receive larger doses of radiation across fewer treatment sessions--typically completing treatment in four weeks. For her we will offer hypofractionated WBI to a dose of 4005 Centigray (cGy) in 15 fractions. We will treat her in the prone setup in order to decrease any cardiac involvement.     We have discussed about the side effects that she may experience during her treatment. These side effects include, but are not limited to, short term side effects such as: tiredness, weakness, swelling of the breast, skin changes, " and telangiectasia.    Today the patient has no complaints and is doing well after surgery. The patient is agreeable to begin the treatment process.     Electronically signed by Cydney Buckner MD, 11/10/21, 10:08 AM EST.    I, Cydney Buckner MD, personally performed the services described in this documentation as scribed by the above named individual in my presence, and it is both accurate and complete.  11/10/2021  10:08 EST    Follow Up:   CT SIM scheduled for 11/15/2021, covid swab to be performed today      Scribed for Dr. Cydney Buckner MD by LAURI Melendez 11/10/2021  09:29 EST

## 2021-11-15 ENCOUNTER — APPOINTMENT (OUTPATIENT)
Dept: RADIATION ONCOLOGY | Facility: HOSPITAL | Age: 66
End: 2021-11-15

## 2021-11-15 PROCEDURE — 77332 RADIATION TREATMENT AID(S): CPT | Performed by: RADIOLOGY

## 2021-11-15 PROCEDURE — 77290 THER RAD SIMULAJ FIELD CPLX: CPT | Performed by: RADIOLOGY

## 2021-11-16 PROCEDURE — 77334 RADIATION TREATMENT AID(S): CPT | Performed by: RADIOLOGY

## 2021-11-16 PROCEDURE — 77300 RADIATION THERAPY DOSE PLAN: CPT | Performed by: RADIOLOGY

## 2021-11-16 PROCEDURE — 77295 3-D RADIOTHERAPY PLAN: CPT | Performed by: RADIOLOGY

## 2021-11-17 PROCEDURE — 77300 RADIATION THERAPY DOSE PLAN: CPT | Performed by: RADIOLOGY

## 2021-11-29 PROCEDURE — 77280 THER RAD SIMULAJ FIELD SMPL: CPT | Performed by: RADIOLOGY

## 2021-11-30 ENCOUNTER — RADIATION ONCOLOGY WEEKLY ASSESSMENT (OUTPATIENT)
Dept: RADIATION ONCOLOGY | Facility: HOSPITAL | Age: 66
End: 2021-11-30

## 2021-11-30 VITALS
TEMPERATURE: 97.8 F | BODY MASS INDEX: 23.3 KG/M2 | WEIGHT: 140 LBS | DIASTOLIC BLOOD PRESSURE: 80 MMHG | HEART RATE: 61 BPM | OXYGEN SATURATION: 100 % | SYSTOLIC BLOOD PRESSURE: 150 MMHG | RESPIRATION RATE: 18 BRPM

## 2021-11-30 DIAGNOSIS — D05.12 BREAST NEOPLASM, TIS (DCIS), LEFT: Primary | ICD-10-CM

## 2021-11-30 PROCEDURE — 77427 RADIATION TX MANAGEMENT X5: CPT | Performed by: RADIOLOGY

## 2021-11-30 PROCEDURE — 77387 GUIDANCE FOR RADJ TX DLVR: CPT | Performed by: RADIOLOGY

## 2021-11-30 PROCEDURE — 77412 RADIATION TX DELIVERY LVL 3: CPT | Performed by: RADIOLOGY

## 2021-11-30 PROCEDURE — G6002 STEREOSCOPIC X-RAY GUIDANCE: HCPCS | Performed by: RADIOLOGY

## 2021-11-30 NOTE — PROGRESS NOTES
OTV Note      Patient Name: Dodie Kelly  : 1955   MRN: [id number]     Diagnosis:    Chief Complaint   Patient presents with   • Breast Cancer      Staging: DCIS of the left breast, ER/[place] Positive    This patient was seen today for an on treatment visit. The patient is receiving radiation treatment to the breast. The patient has received XRT Dose (cGy): 267 cGy in 1 fractions out of a planned dose of 4005 cGy in 15 fractions.       Subjective      Review of Systems:   Review of Systems   Constitutional: Negative.    HENT: Negative.  Negative for trouble swallowing.    Eyes: Negative.    Respiratory: Negative.  Negative for cough and shortness of breath.    Cardiovascular: Negative.  Negative for chest pain.   Gastrointestinal: Negative.    Endocrine: Negative.    Genitourinary: Negative.  Negative for breast discharge, breast lump and breast pain.   Musculoskeletal: Negative.    Skin: Negative.  Negative for color change.   Neurological: Negative.    Hematological: Negative.    Psychiatric/Behavioral: Negative.      Review of Systems   Constitutional: Negative.    HENT:  Negative.  Negative for trouble swallowing.    Eyes: Negative.    Respiratory: Negative.  Negative for cough and shortness of breath.    Cardiovascular: Negative.  Negative for chest pain.   Gastrointestinal: Negative.    Endocrine: Negative.    Genitourinary: Negative.     Musculoskeletal: Negative.    Skin: Negative.  Negative for color change.   Neurological: Negative.    Hematological: Negative.    Psychiatric/Behavioral: Negative.        Medications:     Current Outpatient Medications:   •  amLODIPine (NORVASC) 5 MG tablet, Take 5 mg by mouth Daily., Disp: , Rfl:   •  HYDROcodone-acetaminophen (Norco) 7.5-325 MG per tablet, Take 1 tablet by mouth 4 (Four) Times a Day As Needed for Moderate Pain., Disp: 8 tablet, Rfl: 0  •  Omega-3 Fatty Acids (fish oil) 1000 MG capsule capsule, Take  by mouth Daily With Breakfast., Disp: ,  Rfl:   •  omeprazole (priLOSEC) 20 MG capsule, Take 20 mg by mouth Daily., Disp: , Rfl:   •  Vitamin D, Cholecalciferol, (CHOLECALCIFEROL) 10 MCG (400 UNIT) tablet, Take 400 Units by mouth Daily., Disp: , Rfl:     Allergies:   Allergies   Allergen Reactions   • Bactrim [Sulfamethoxazole-Trimethoprim] Other (See Comments)     Passed out   • Lisinopril Cough   • Losartan Rash         Objective       Vital Signs:   Vitals:    11/30/21 0842   BP: 150/80   Pulse: 61   Resp: 18   Temp: 97.8 °F (36.6 °C)   TempSrc: Temporal   SpO2: 100%   Weight: 63.5 kg (140 lb)   PainSc: 0-No pain     Body mass index is 23.3 kg/m².     Current Total XRT Dose (cGY): XRT Dose (cGy): 267    Plan      Plan:   Patient was seen today for an on treatment visit. Patient is receiving radiation therapy to the breast. Patient is stable and tolerating radiation therapy well with minimal side effects. Today is her first day of treatment. No complaints today. Instructed her on skin care to the treatment area. Continue with radiation therapy.     I have reviewed treatment setup notes, checked and approved the daily guidance images. I reviewed dose delivery, treatment parameters and deemed them appropriate. We plan to continue radiation therapy as prescribed.     I, Cydney Buckner MD, personally performed the services described in this documentation as scribed by the above named individual in my presence, and it is both accurate and complete.  11/30/2021  08:55 EST     Electronically signed by Cydney Buckner MD, 11/30/21, 8:55 AM EST.      Scribed for Dr. Cydney Buckner MD by LUARI Melendez 11/30/2021  08:51 EST

## 2021-12-01 ENCOUNTER — OFFICE VISIT (OUTPATIENT)
Dept: RADIATION ONCOLOGY | Facility: HOSPITAL | Age: 66
End: 2021-12-01

## 2021-12-01 PROCEDURE — G6002 STEREOSCOPIC X-RAY GUIDANCE: HCPCS | Performed by: RADIOLOGY

## 2021-12-01 PROCEDURE — 77412 RADIATION TX DELIVERY LVL 3: CPT | Performed by: RADIOLOGY

## 2021-12-01 PROCEDURE — 77387 GUIDANCE FOR RADJ TX DLVR: CPT | Performed by: RADIOLOGY

## 2021-12-02 PROCEDURE — 77412 RADIATION TX DELIVERY LVL 3: CPT | Performed by: RADIOLOGY

## 2021-12-02 PROCEDURE — 77336 RADIATION PHYSICS CONSULT: CPT | Performed by: RADIOLOGY

## 2021-12-02 PROCEDURE — G6002 STEREOSCOPIC X-RAY GUIDANCE: HCPCS | Performed by: RADIOLOGY

## 2021-12-02 PROCEDURE — 77387 GUIDANCE FOR RADJ TX DLVR: CPT | Performed by: RADIOLOGY

## 2021-12-03 PROCEDURE — G6002 STEREOSCOPIC X-RAY GUIDANCE: HCPCS | Performed by: RADIOLOGY

## 2021-12-03 PROCEDURE — 77412 RADIATION TX DELIVERY LVL 3: CPT | Performed by: RADIOLOGY

## 2021-12-03 PROCEDURE — 77387 GUIDANCE FOR RADJ TX DLVR: CPT | Performed by: RADIOLOGY

## 2021-12-06 PROCEDURE — G6002 STEREOSCOPIC X-RAY GUIDANCE: HCPCS | Performed by: RADIOLOGY

## 2021-12-06 PROCEDURE — 77412 RADIATION TX DELIVERY LVL 3: CPT | Performed by: RADIOLOGY

## 2021-12-06 PROCEDURE — 77387 GUIDANCE FOR RADJ TX DLVR: CPT | Performed by: RADIOLOGY

## 2021-12-07 ENCOUNTER — RADIATION ONCOLOGY WEEKLY ASSESSMENT (OUTPATIENT)
Dept: RADIATION ONCOLOGY | Facility: HOSPITAL | Age: 66
End: 2021-12-07

## 2021-12-07 VITALS
WEIGHT: 140 LBS | SYSTOLIC BLOOD PRESSURE: 156 MMHG | HEART RATE: 72 BPM | BODY MASS INDEX: 23.3 KG/M2 | TEMPERATURE: 97.5 F | RESPIRATION RATE: 18 BRPM | DIASTOLIC BLOOD PRESSURE: 86 MMHG | OXYGEN SATURATION: 96 %

## 2021-12-07 DIAGNOSIS — D05.12 BREAST NEOPLASM, TIS (DCIS), LEFT: Primary | ICD-10-CM

## 2021-12-07 PROCEDURE — 77412 RADIATION TX DELIVERY LVL 3: CPT | Performed by: RADIOLOGY

## 2021-12-07 PROCEDURE — 77427 RADIATION TX MANAGEMENT X5: CPT | Performed by: RADIOLOGY

## 2021-12-07 PROCEDURE — G6002 STEREOSCOPIC X-RAY GUIDANCE: HCPCS | Performed by: RADIOLOGY

## 2021-12-07 PROCEDURE — 77387 GUIDANCE FOR RADJ TX DLVR: CPT | Performed by: RADIOLOGY

## 2021-12-07 NOTE — PROGRESS NOTES
OTV Note      Patient Name: Dodie Kelly  : 1955   MRN: [id number]     Diagnosis:  Breast Cancer   Staging: DCIS of the left breast, ER/[place] Positive    This patient was seen today for an on treatment visit. The patient is receiving radiation treatment to the breast. The patient has received XRT Dose (cGy): 1602 cGy in 6 fractions out of a planned dose of 4005 cGy in 15 fractions.       Subjective      Review of Systems:   Review of Systems   Constitutional: Negative.    HENT: Negative.  Negative for trouble swallowing.    Eyes: Negative.    Respiratory: Negative.  Negative for cough and shortness of breath.    Cardiovascular: Negative.  Negative for chest pain.   Gastrointestinal: Negative.    Endocrine: Negative.    Genitourinary: Negative.  Negative for breast discharge, breast lump and breast pain.   Musculoskeletal: Negative.    Skin: Negative.  Negative for color change.   Neurological: Negative.    Hematological: Negative.    Psychiatric/Behavioral: Negative.      Review of Systems   Constitutional: Negative.    HENT:  Negative.  Negative for trouble swallowing.    Eyes: Negative.    Respiratory: Negative.  Negative for cough and shortness of breath.    Cardiovascular: Negative.  Negative for chest pain.   Gastrointestinal: Negative.    Endocrine: Negative.    Genitourinary: Negative.     Musculoskeletal: Negative.    Skin: Negative.  Negative for color change.   Neurological: Negative.    Hematological: Negative.    Psychiatric/Behavioral: Negative.        Medications:     Current Outpatient Medications:   •  amLODIPine (NORVASC) 5 MG tablet, Take 5 mg by mouth Daily., Disp: , Rfl:   •  HYDROcodone-acetaminophen (Norco) 7.5-325 MG per tablet, Take 1 tablet by mouth 4 (Four) Times a Day As Needed for Moderate Pain., Disp: 8 tablet, Rfl: 0  •  Omega-3 Fatty Acids (fish oil) 1000 MG capsule capsule, Take  by mouth Daily With Breakfast., Disp: , Rfl:   •  omeprazole (priLOSEC) 20 MG capsule, Take  20 mg by mouth Daily., Disp: , Rfl:   •  Vitamin D, Cholecalciferol, (CHOLECALCIFEROL) 10 MCG (400 UNIT) tablet, Take 400 Units by mouth Daily., Disp: , Rfl:     Allergies:   Allergies   Allergen Reactions   • Bactrim [Sulfamethoxazole-Trimethoprim] Other (See Comments)     Passed out   • Lisinopril Cough   • Losartan Rash         Objective       Vital Signs:   Vitals:    12/07/21 0841   BP: 156/86   Pulse: 72   Resp: 18   Temp: 97.5 °F (36.4 °C)   TempSrc: Temporal   SpO2: 96%   Weight: 63.5 kg (140 lb)   PainSc: 0-No pain     Body mass index is 23.3 kg/m².     Current Total XRT Dose (cGY): XRT Dose (cGy): 1602    Plan      Plan:   Patient was seen today for an on treatment visit. Patient is receiving radiation therapy to the breast. Patient is stable and tolerating radiation therapy well with minimal side effects.  No complaints today.  Skin looks good.  Continue with radiation therapy.       I have reviewed treatment setup notes, checked and approved the daily guidance images. I reviewed dose delivery, treatment parameters and deemed them appropriate. We plan to continue radiation therapy as prescribed.     I, Cydney Buckner MD, personally performed the services described in this documentation as scribed by the above named individual in my presence, and it is both accurate and complete.  12/7/2021  08:54 EST     Electronically signed by Cydney Buckner MD, 12/07/21, 8:54 AM EST.  Scribed for Dr. Cydney Buckner MD by LAURI Melendez 12/7/2021  08:43 EST

## 2021-12-08 PROCEDURE — 77412 RADIATION TX DELIVERY LVL 3: CPT | Performed by: RADIOLOGY

## 2021-12-08 PROCEDURE — G6002 STEREOSCOPIC X-RAY GUIDANCE: HCPCS | Performed by: RADIOLOGY

## 2021-12-08 PROCEDURE — 77387 GUIDANCE FOR RADJ TX DLVR: CPT | Performed by: RADIOLOGY

## 2021-12-08 PROCEDURE — 77336 RADIATION PHYSICS CONSULT: CPT | Performed by: RADIOLOGY

## 2021-12-09 PROCEDURE — G6002 STEREOSCOPIC X-RAY GUIDANCE: HCPCS | Performed by: RADIOLOGY

## 2021-12-09 PROCEDURE — 77387 GUIDANCE FOR RADJ TX DLVR: CPT | Performed by: RADIOLOGY

## 2021-12-09 PROCEDURE — 77412 RADIATION TX DELIVERY LVL 3: CPT | Performed by: RADIOLOGY

## 2021-12-10 PROCEDURE — 77387 GUIDANCE FOR RADJ TX DLVR: CPT | Performed by: RADIOLOGY

## 2021-12-10 PROCEDURE — 77412 RADIATION TX DELIVERY LVL 3: CPT | Performed by: RADIOLOGY

## 2021-12-10 PROCEDURE — G6002 STEREOSCOPIC X-RAY GUIDANCE: HCPCS | Performed by: RADIOLOGY

## 2021-12-13 PROCEDURE — G6002 STEREOSCOPIC X-RAY GUIDANCE: HCPCS | Performed by: RADIOLOGY

## 2021-12-13 PROCEDURE — 77387 GUIDANCE FOR RADJ TX DLVR: CPT | Performed by: RADIOLOGY

## 2021-12-13 PROCEDURE — 77412 RADIATION TX DELIVERY LVL 3: CPT | Performed by: RADIOLOGY

## 2021-12-14 ENCOUNTER — RADIATION ONCOLOGY WEEKLY ASSESSMENT (OUTPATIENT)
Dept: RADIATION ONCOLOGY | Facility: HOSPITAL | Age: 66
End: 2021-12-14

## 2021-12-14 VITALS
OXYGEN SATURATION: 100 % | BODY MASS INDEX: 23.3 KG/M2 | RESPIRATION RATE: 18 BRPM | WEIGHT: 140 LBS | HEART RATE: 66 BPM | TEMPERATURE: 97.5 F | SYSTOLIC BLOOD PRESSURE: 155 MMHG | DIASTOLIC BLOOD PRESSURE: 84 MMHG

## 2021-12-14 DIAGNOSIS — D05.12 BREAST NEOPLASM, TIS (DCIS), LEFT: Primary | ICD-10-CM

## 2021-12-14 PROCEDURE — 77427 RADIATION TX MANAGEMENT X5: CPT | Performed by: RADIOLOGY

## 2021-12-14 PROCEDURE — G6002 STEREOSCOPIC X-RAY GUIDANCE: HCPCS | Performed by: RADIOLOGY

## 2021-12-14 PROCEDURE — G0463 HOSPITAL OUTPT CLINIC VISIT: HCPCS

## 2021-12-14 PROCEDURE — 77387 GUIDANCE FOR RADJ TX DLVR: CPT | Performed by: RADIOLOGY

## 2021-12-14 PROCEDURE — 77412 RADIATION TX DELIVERY LVL 3: CPT | Performed by: RADIOLOGY

## 2021-12-14 NOTE — PROGRESS NOTES
OTV Note      Patient Name: Dodie Kelly  : 1955   MRN: [id number]     Diagnosis:    Chief Complaint   Patient presents with   • Breast Cancer      Staging: DCIS of the left breast, ER/[place] Positive    This patient was seen today for an on treatment visit. The patient is receiving radiation treatment to the breast. The patient has received XRT Dose (cGy): 2937 cGy in 11 fractions out of a planned dose of 4005 cGy in 15 fractions.       Subjective      Review of Systems:   Review of Systems   Constitutional: Negative.    HENT: Negative.  Negative for trouble swallowing.    Eyes: Negative.    Respiratory: Negative.  Negative for cough and shortness of breath.    Cardiovascular: Negative.  Negative for chest pain.   Gastrointestinal: Negative.    Endocrine: Negative.    Genitourinary: Negative.  Negative for breast discharge, breast lump and breast pain.   Musculoskeletal: Negative.    Skin: Negative.  Negative for color change and dry skin.   Neurological: Negative.    Hematological: Negative.    Psychiatric/Behavioral: Negative.      Review of Systems   Constitutional: Negative.    HENT:  Negative.  Negative for trouble swallowing.    Eyes: Negative.    Respiratory: Negative.  Negative for cough and shortness of breath.    Cardiovascular: Negative.  Negative for chest pain.   Gastrointestinal: Negative.    Endocrine: Negative.    Genitourinary: Negative.     Musculoskeletal: Negative.    Skin: Negative.  Negative for color change.   Neurological: Negative.    Hematological: Negative.    Psychiatric/Behavioral: Negative.        Medications:     Current Outpatient Medications:   •  amLODIPine (NORVASC) 5 MG tablet, Take 5 mg by mouth Daily., Disp: , Rfl:   •  HYDROcodone-acetaminophen (Norco) 7.5-325 MG per tablet, Take 1 tablet by mouth 4 (Four) Times a Day As Needed for Moderate Pain., Disp: 8 tablet, Rfl: 0  •  Omega-3 Fatty Acids (fish oil) 1000 MG capsule capsule, Take  by mouth Daily With  Breakfast., Disp: , Rfl:   •  omeprazole (priLOSEC) 20 MG capsule, Take 20 mg by mouth Daily., Disp: , Rfl:   •  Vitamin D, Cholecalciferol, (CHOLECALCIFEROL) 10 MCG (400 UNIT) tablet, Take 400 Units by mouth Daily., Disp: , Rfl:     Allergies:   Allergies   Allergen Reactions   • Bactrim [Sulfamethoxazole-Trimethoprim] Other (See Comments)     Passed out   • Lisinopril Cough   • Losartan Rash         Objective       Vital Signs:   Vitals:    12/14/21 0843   BP: 155/84   Pulse: 66   Resp: 18   Temp: 97.5 °F (36.4 °C)   TempSrc: Temporal   SpO2: 100%   Weight: 63.5 kg (140 lb)   PainSc: 0-No pain     Body mass index is 23.3 kg/m².     Current Total XRT Dose (cGY): XRT Dose (cGy): 2937    Plan      Plan:   Patient was seen today for an on treatment visit. Patient is receiving radiation therapy to the breast. Patient is stable and tolerating radiation therapy well with minimal side effects.  No new problems today.  Patient is doing well and skin looks really good today.  Continue skin care routine at home.  Continue with radiation therapy.     I have reviewed treatment setup notes, checked and approved the daily guidance images. I reviewed dose delivery, treatment parameters and deemed them appropriate. We plan to continue radiation therapy as prescribed.     I, Cydney Buckner MD, personally performed the services described in this documentation as scribed by the above named individual in my presence, and it is both accurate and complete.  12/14/2021  08:58 EST     Electronically signed by Cydney Buckner MD, 12/14/21, 8:58 AM EST.    Scribed for Dr. Cydney Buckner MD by LAURI Melendez 12/14/2021  08:45 EST

## 2021-12-15 PROCEDURE — 77336 RADIATION PHYSICS CONSULT: CPT | Performed by: RADIOLOGY

## 2021-12-15 PROCEDURE — 77387 GUIDANCE FOR RADJ TX DLVR: CPT | Performed by: RADIOLOGY

## 2021-12-15 PROCEDURE — G6002 STEREOSCOPIC X-RAY GUIDANCE: HCPCS | Performed by: RADIOLOGY

## 2021-12-15 PROCEDURE — 77412 RADIATION TX DELIVERY LVL 3: CPT | Performed by: RADIOLOGY

## 2021-12-16 PROCEDURE — 77387 GUIDANCE FOR RADJ TX DLVR: CPT | Performed by: RADIOLOGY

## 2021-12-16 PROCEDURE — 77412 RADIATION TX DELIVERY LVL 3: CPT | Performed by: RADIOLOGY

## 2021-12-16 PROCEDURE — G6002 STEREOSCOPIC X-RAY GUIDANCE: HCPCS | Performed by: RADIOLOGY

## 2021-12-17 PROCEDURE — 77412 RADIATION TX DELIVERY LVL 3: CPT | Performed by: RADIOLOGY

## 2021-12-17 PROCEDURE — 77387 GUIDANCE FOR RADJ TX DLVR: CPT | Performed by: RADIOLOGY

## 2021-12-17 PROCEDURE — G6002 STEREOSCOPIC X-RAY GUIDANCE: HCPCS | Performed by: RADIOLOGY

## 2021-12-20 PROCEDURE — 77387 GUIDANCE FOR RADJ TX DLVR: CPT | Performed by: RADIOLOGY

## 2021-12-20 PROCEDURE — 77412 RADIATION TX DELIVERY LVL 3: CPT | Performed by: RADIOLOGY

## 2021-12-20 PROCEDURE — G6002 STEREOSCOPIC X-RAY GUIDANCE: HCPCS | Performed by: RADIOLOGY

## 2021-12-22 ENCOUNTER — IMMUNIZATION (OUTPATIENT)
Dept: VACCINE CLINIC | Facility: HOSPITAL | Age: 66
End: 2021-12-22

## 2021-12-22 PROCEDURE — 0004A HC ADM SARSCOV2 30MCG/0.3ML BOOSTER: CPT | Performed by: INTERNAL MEDICINE

## 2021-12-22 PROCEDURE — 91300 HC SARSCOV02 VAC 30MCG/0.3ML IM: CPT | Performed by: INTERNAL MEDICINE

## 2022-01-03 ENCOUNTER — OFFICE VISIT (OUTPATIENT)
Dept: RADIATION ONCOLOGY | Facility: HOSPITAL | Age: 67
End: 2022-01-03

## 2022-01-03 VITALS
TEMPERATURE: 97.8 F | HEART RATE: 66 BPM | OXYGEN SATURATION: 97 % | DIASTOLIC BLOOD PRESSURE: 87 MMHG | BODY MASS INDEX: 23.63 KG/M2 | RESPIRATION RATE: 16 BRPM | WEIGHT: 142 LBS | SYSTOLIC BLOOD PRESSURE: 161 MMHG

## 2022-01-03 DIAGNOSIS — D05.12 BREAST NEOPLASM, TIS (DCIS), LEFT: Primary | ICD-10-CM

## 2022-01-03 PROCEDURE — 99212-NC PR NO CHARGE CBC OFFICE OUTPATIENT VISIT 10 MINUTES

## 2022-01-03 PROCEDURE — G0463 HOSPITAL OUTPT CLINIC VISIT: HCPCS

## 2022-01-03 NOTE — PROGRESS NOTES
Office Follow Up Note      Patient Name: Dodie Kelly  : 1955   MRN: [id number]     Requesting Physician: Estefania Dias MD    Chief Complaint:  Breast Cancer   Staging:  Tis DCIS of the left breast, ER/[place] Positive    History of Present Illness: Dodie Kelly is a pleasant 66 y.o. female who is here today for follow up of after completing radiation therapy.    Mrs. Kelly underwent routine screening mammograms yearly.  At her yearly screening mammogram on 2021 the results revealed left breast calcifications and additional imaging was recommended.  Up to this point the patient had had no positive or suspicious mammograms in the past, or biopsies.  She also had never noted any palpable masses, discharge, or pain in the breasts up to this point.  On 2021 the patient underwent a diagnostic mammogram which revealed grouped forward amorphous calcifications in the left 1:00 to 2:00 region.  A biopsy was performed on 2021 which revealed DCIS, ER positive, [place] positive.  Patient was referred to Dr. Dias and she was seen on 2021, she was scheduled for surgery on 2021.  She underwent a left lumpectomy, she did well with this procedure and healed well with no complications other than mild soreness at the incision site (which has now resolved).  Pathology after the lumpectomy determined that she had a grade 1-2 (low to intermediate risk), no lymph node involvement, and clear margins with the closest margin being 2 mm.     Interval History:  Today she is status post XRT.  She completed 4005 cGy in 15 fractions to the left breast.  She started on 2021, and finished on 2021.  She was treated in the prone position to minimize cardiac involvement.  Today is her 2-week checkup of her skin.  At her last OTV appointment she was doing well with minimal skin reaction.  Today she is doing good and has tolerated XRT very well.  She has no  complaints today.      Subjective      Review of Systems:   Review of Systems   Constitutional: Negative.  Negative for fatigue.   HENT:  Negative.  Negative for sore throat and trouble swallowing.    Eyes: Negative.    Respiratory: Negative.  Negative for chest tightness, cough and shortness of breath.    Cardiovascular: Negative.  Negative for chest pain.   Gastrointestinal: Negative.  Negative for nausea and vomiting.   Endocrine: Negative.    Genitourinary: Negative.     Musculoskeletal: Negative.    Skin: Positive for color change. Negative for rash.   Neurological: Negative.    Hematological: Negative.  Negative for adenopathy.   Psychiatric/Behavioral: Negative.      Review of Systems   Constitutional: Negative.  Negative for fatigue.   HENT: Negative.  Negative for sore throat and trouble swallowing.    Eyes: Negative.    Respiratory: Negative.  Negative for cough, chest tightness and shortness of breath.    Cardiovascular: Negative.  Negative for chest pain.   Gastrointestinal: Negative.  Negative for nausea and vomiting.   Endocrine: Negative.    Genitourinary: Negative.  Negative for breast discharge, breast lump and breast pain.   Musculoskeletal: Negative.    Skin: Positive for color change. Negative for dry skin, rash and skin lesions.   Neurological: Negative.    Hematological: Negative.  Negative for adenopathy.   Psychiatric/Behavioral: Negative.        I have reviewed and confirmed the accuracy of the ROS as documented by the MA/LPN/RN LAURI Pulido     The following portions of the patient's history were reviewed and updated as appropriate: allergies, current medications, past family history, past medical history, past social history, past surgical history and problem list.    Past Oncology History:   Oncology/Hematology History    No history exists.        KPS: 90 %     Results Review:   The following data was reviewed by: LAURI Pulido on 01/03/2022:  Common labs     Common Labsle 10/8/21 10/8/21    0943 0943   Glucose  88   BUN  18   Creatinine  0.78   eGFR Non African Am  74   Sodium  143   Potassium  4.0   Chloride  104   Calcium  9.9   WBC 6.34    Hemoglobin 13.4    Hematocrit 41.6    Platelets 233            Covid Tests    Common Labsle 11/10/21   COVID19 Not Detected             Imaging:     Screening Mammogram- 9/14/2021  FINDINGS:  The breasts are heterogeneously dense, which may obscure  small masses.     The bilateral fibroglandular pattern is stable in appearance. No  dominant mass or area of architectural distortion is seen.  Calcifications are present in the left upper outer quadrant for which  additional imaging is recommended.     IMPRESSION:  1. Stable mammographic appearance of the right breast with no findings  suspicious for malignancy.  2. Calcifications in the left breast. Recommend additional imaging.        BI-RADS CATEGORY:  0, INCOMPLETE: Need additional imaging evaluation.     RECOMMENDATION:  Left ML and CC magnification views.        Diagnostic Mammogram- 9/16/2021  FINDINGS: Magnification imaging centered over the upper outer quadrant  of the left breast demonstrates a small group of amorphous  calcifications in the left 1:00 to 2:00 region. Recommend stereotactic  guided core biopsy.     IMPRESSION:  Grouped amorphous calcifications in the left 1:00 to 2:00  region.     BI-RADS CATEGORY:  4, SUSPICIOUS    Pathology:    9/22/2021             10/12/2021            Objective     Physical Exam:  Physical Exam  Vitals reviewed.   Constitutional:       General: She is not in acute distress.     Appearance: Normal appearance. She is normal weight. She is not ill-appearing.   HENT:      Head: Normocephalic.      Nose: Nose normal.      Mouth/Throat:      Mouth: Mucous membranes are moist.      Pharynx: Oropharynx is clear.   Eyes:      Extraocular Movements: Extraocular movements intact.      Conjunctiva/sclera: Conjunctivae normal.      Pupils: Pupils  are equal, round, and reactive to light.   Cardiovascular:      Rate and Rhythm: Normal rate and regular rhythm.      Pulses: Normal pulses.      Heart sounds: Normal heart sounds.   Pulmonary:      Effort: Pulmonary effort is normal. No respiratory distress.      Breath sounds: Normal breath sounds.   Chest:   Breasts:      Right: Normal. No axillary adenopathy or supraclavicular adenopathy.      Left: Skin change (darkness to XRT area, slight erythema to skin fold of the left breast) present. No swelling, mass, tenderness, axillary adenopathy or supraclavicular adenopathy.        Comments: Healed surgical scar noted to left breast  Abdominal:      General: Abdomen is flat. Bowel sounds are normal. There is no distension.      Palpations: Abdomen is soft. There is no mass.   Musculoskeletal:         General: No swelling or tenderness. Normal range of motion.      Right upper arm: Normal.      Left upper arm: Normal. No swelling, edema or tenderness.      Cervical back: Normal range of motion.   Lymphadenopathy:      Upper Body:      Right upper body: No supraclavicular, axillary or pectoral adenopathy.      Left upper body: No supraclavicular, axillary or pectoral adenopathy.   Skin:     General: Skin is warm and dry.      Capillary Refill: Capillary refill takes less than 2 seconds.   Neurological:      General: No focal deficit present.      Mental Status: She is alert and oriented to person, place, and time. Mental status is at baseline.   Psychiatric:         Mood and Affect: Mood normal.         Behavior: Behavior normal.         Thought Content: Thought content normal.         Judgment: Judgment normal.         Vital Signs:   Vitals:    01/03/22 0915   BP: 161/87   Pulse: 66   Resp: 16   Temp: 97.8 °F (36.6 °C)   TempSrc: Temporal   SpO2: 97%   Weight: 64.4 kg (142 lb)     Body mass index is 23.63 kg/m².       Assessment / Plan      Assessment/Plan:   Mrs. Kelly is a pleasant 65 year old who is here today  s/p left lumpectomy with Dr. Dias. She was found to have left breast DCIS grade 1-2, ER positive, [place] positive. No lymph node involvement, and clear margins with the closest margin being 2 mm. At this time she declined HRT, she thinks that this is not the right route for her.     Whole Breast radiation was indicated. She recieved hypofractionated WBI to a dose of 4005 Centigray (cGy) in 15 fractions. She was treated in the prone setup in order to decrease any cardiac involvement.       Today is her 2-week checkup of her skin after completing XRT.  She started XRT on 11/30/2021, and finished on 12/20/2021.  At her last OTV appointment she was doing well with minimal skin reaction.  Today she is doing good and has tolerated XRT very well.  No complaints of pain, swelling, lymphedema, trouble swallowing or sore throat, erythema at the XRT site, wounds at the XRT site, or any chest pain or discomfort.  Her skin looks really good.  The left breast skin fold is very slightly erythemic with no open areas or peeling.  The left breast does appear darker than the right at this time.  Instructed the patient that this was normal.  Continue current skin care regimen.  Continue arm exercises.  Instructed on the long-term effects of XRT to the breast, such as: fibrosis, lymphedema, and pneumonitis.    She initially refused HRT and is still adamant that she does not want it today.  Dr. Dias performed her surgery and saw her for a follow-up postoperatively.  She has no further follow-ups with Dr. Dias at this time.  We will order a mammogram for 6 months from now to assess disease management, as well as obtaining CBC and CMP at the time of her next follow-up appointment with us.  Instructed the patient that if she needs us before her next appointment with us or has any problems before the appointment to let us know and we will be happy to see her.      Follow Up:   Return in about 6 months (around 7/3/2022) for Office Visit,  Labs - See Treatment Plan, Imaging - See orders.    Lucy Helms, APRN  01/03/22 09:36 EST

## 2022-03-17 ENCOUNTER — OFFICE VISIT (OUTPATIENT)
Dept: SURGERY | Facility: CLINIC | Age: 67
End: 2022-03-17

## 2022-03-17 VITALS
BODY MASS INDEX: 24.69 KG/M2 | HEART RATE: 76 BPM | WEIGHT: 148.2 LBS | DIASTOLIC BLOOD PRESSURE: 74 MMHG | SYSTOLIC BLOOD PRESSURE: 126 MMHG | HEIGHT: 65 IN

## 2022-03-17 DIAGNOSIS — D05.12 BREAST NEOPLASM, TIS (DCIS), LEFT: Primary | ICD-10-CM

## 2022-03-17 DIAGNOSIS — Z13.820 SCREENING FOR OSTEOPOROSIS: ICD-10-CM

## 2022-03-17 DIAGNOSIS — Z78.0 POST-MENOPAUSE: Primary | ICD-10-CM

## 2022-03-17 PROCEDURE — 99213 OFFICE O/P EST LOW 20 MIN: CPT | Performed by: SURGERY

## 2022-03-17 NOTE — PROGRESS NOTES
Subjective   Dodie Kelly is a 66 y.o. female follow up after radiation.    History of Present Illness   was seen in the office today for breast cancer follow-up.  She is status post a left lumpectomy for DCIS on 10/12/2021.  Patient completed a course of radiation just before Christmas.  She was due to follow-up post radiation to discuss hormonal therapy but for unclear reasons did not return to the office intact today.  She denies any palpable abnormalities in the breast or the axilla.  She denies any arm edema.  She denies any changes in her health.  She has never had a bone density.  Allergies   Allergen Reactions   • Bactrim [Sulfamethoxazole-Trimethoprim] Other (See Comments)     Passed out   • Lisinopril Cough   • Losartan Rash       Current Outpatient Medications   Medication Sig Dispense Refill   • amLODIPine (NORVASC) 5 MG tablet Take 5 mg by mouth Daily.     • Omega-3 Fatty Acids (fish oil) 1000 MG capsule capsule Take  by mouth Daily With Breakfast.     • omeprazole (priLOSEC) 20 MG capsule Take 20 mg by mouth Daily.     • Vitamin D, Cholecalciferol, (CHOLECALCIFEROL) 10 MCG (400 UNIT) tablet Take 400 Units by mouth Daily.     • HYDROcodone-acetaminophen (Norco) 7.5-325 MG per tablet Take 1 tablet by mouth 4 (Four) Times a Day As Needed for Moderate Pain. 8 tablet 0     No current facility-administered medications for this visit.     Past Medical History:   Diagnosis Date   • Breast cancer (HCC)    • Cancer (HCC)     breast   • GERD (gastroesophageal reflux disease)    • Hypertension      Past Surgical History:   Procedure Laterality Date   • BREAST BIOPSY Left 10/12/2021    Procedure: BREAST BIOPSY WITH NEEDLE LOCALIZATION;  Surgeon: Estefania Dias MD;  Location: [facility];  Service: General;  Laterality: Left;   • BREAST LUMPECTOMY     • COLONOSCOPY         Pertinent Review of Systems  Well-developed well-nourished female no acute distress.  Neck:  No supraclavicular adenopathy  Lungs:  " Respiratory effort normal. Auscultation: Clear, without wheezes, rhonchi, rales  Heart:  Regular rate and rhythm, without murmur, gallop, rub.  No pedal edema  Breasts: On visual inspection the breasts are symmetrical with the exception of some hyperpigmentation of the left breast .  Examination of the right breast demonstrates no discrete mass, skin change, or axillary adenopathy.  Examination of the left breast demonstrates a healed surgical scar in the upper outer quadrant.  The tissue is dense.  There is no palpable mass or adenopathy..   Upper extremity: Without edema        Objective   /74 (BP Location: Left arm)   Pulse 76   Ht 165.1 cm (65\")   Wt 67.2 kg (148 lb 3.2 oz)   BMI 24.66 kg/m²    Physical Exam  Well-developed well-nourished female no acute distress.  Neck:  No supraclavicular adenopathy  Lungs:  Respiratory effort normal. Auscultation: Clear, without wheezes, rhonchi, rales  Heart:  Regular rate and rhythm, without murmur, gallop, rub.  No pedal edema  Breasts: On visual inspection the breasts are symmetrical with the exception of some hyperpigmentation of the left breast .  Examination of the right breast demonstrates no discrete mass, skin change, or axillary adenopathy.  Examination of the left breast demonstrates a healed surgical scar in the upper outer quadrant.  The tissue is dense.  There is no palpable mass or adenopathy..   Upper extremity: Without edema  Procedures     Results/Data:  Records from radiation oncology were reviewed    Assessment/Plan   DCIS of the left breast, ER positive, [place] positive.  Status post lumpectomy with radiation    Plan: Patient declines hormonal therapy.  We will obtain new baseline left breast mammogram now as well as baseline bone density.  Mammogram will be tracked as well as bone density.  Patient will be scheduled for a bilateral mammogram in 6 months with return to the office following last mammogram reports suggests early intervention   "     Discussion/Summary:    Time spent:     Patient's Body mass index is 24.66 kg/m². indicating that she is within normal range (BMI 18.5-24.9). No BMI management plan needed..         Future Appointments   Date Time Provider Department Center   3/17/2022  9:10 AM Estefania Dias MD [facility]   7/7/2022 11:00 AM Lucy Helms APRN [facility]         Please note that portions of this note were completed with a voice recognition program.

## 2022-04-12 ENCOUNTER — HOSPITAL ENCOUNTER (OUTPATIENT)
Dept: MAMMOGRAPHY | Facility: HOSPITAL | Age: 67
Discharge: HOME | End: 2022-04-12

## 2022-04-12 ENCOUNTER — HOSPITAL ENCOUNTER (OUTPATIENT)
Dept: BONE DENSITY | Facility: HOSPITAL | Age: 67
Discharge: HOME | End: 2022-04-12

## 2022-04-12 DIAGNOSIS — Z78.0 POST-MENOPAUSE: ICD-10-CM

## 2022-04-12 DIAGNOSIS — D05.12 BREAST NEOPLASM, TIS (DCIS), LEFT: ICD-10-CM

## 2022-04-12 PROCEDURE — 77080 DXA BONE DENSITY AXIAL: CPT

## 2022-04-12 PROCEDURE — 77066 DX MAMMO INCL CAD BI: CPT | Performed by: RADIOLOGY

## 2022-04-12 PROCEDURE — G0279 TOMOSYNTHESIS, MAMMO: HCPCS

## 2022-04-12 PROCEDURE — 77066 DX MAMMO INCL CAD BI: CPT

## 2022-04-12 PROCEDURE — 77080 DXA BONE DENSITY AXIAL: CPT | Performed by: RADIOLOGY

## 2022-04-12 PROCEDURE — G0279 TOMOSYNTHESIS, MAMMO: HCPCS | Performed by: RADIOLOGY

## 2022-04-22 ENCOUNTER — TELEPHONE (OUTPATIENT)
Dept: SURGERY | Facility: CLINIC | Age: 67
End: 2022-04-22

## 2022-04-22 NOTE — TELEPHONE ENCOUNTER
Informed Dodie that Dr. Dias had looked at her DEXA report and recommended she up the calcium and Vit D to twice a day. Also she has been placed in recall for October for a left mammogram with office visit.

## 2022-07-01 PROBLEM — D05.12 DUCTAL CARCINOMA IN SITU (DCIS) OF LEFT BREAST: Status: ACTIVE | Noted: 2022-07-01

## 2022-07-07 ENCOUNTER — OFFICE VISIT (OUTPATIENT)
Dept: RADIATION ONCOLOGY | Facility: HOSPITAL | Age: 67
End: 2022-07-07

## 2022-07-07 ENCOUNTER — APPOINTMENT (OUTPATIENT)
Dept: RADIATION ONCOLOGY | Facility: HOSPITAL | Age: 67
End: 2022-07-07

## 2022-07-07 VITALS
RESPIRATION RATE: 18 BRPM | DIASTOLIC BLOOD PRESSURE: 77 MMHG | WEIGHT: 134.8 LBS | TEMPERATURE: 97.1 F | BODY MASS INDEX: 22.43 KG/M2 | HEART RATE: 67 BPM | SYSTOLIC BLOOD PRESSURE: 143 MMHG | OXYGEN SATURATION: 98 %

## 2022-07-07 DIAGNOSIS — D05.12 BREAST NEOPLASM, TIS (DCIS), LEFT: Primary | ICD-10-CM

## 2022-07-07 PROCEDURE — 99212 OFFICE O/P EST SF 10 MIN: CPT

## 2022-07-07 PROCEDURE — G0463 HOSPITAL OUTPT CLINIC VISIT: HCPCS

## 2022-07-07 NOTE — PROGRESS NOTES
Office Follow Up Note      Patient Name: Dodie Kelly  : 1955   MRN: [id number]     Requesting Physician: Michelle Kelsey APRN    Chief Complaint:  Breast Cancer   Staging: Tis DCIS of the left breast, ER/LA Positive    History of Present Illness: Dodie Kelly is a pleasant 66 y.o. female who is here today for follow up after completing radiation therapy.     Mrs. Kelly underwent routine screening mammograms yearly.  At her yearly screening mammogram on 2021 the results revealed left breast calcifications and additional imaging was recommended.  Up to this point the patient had had no positive or suspicious mammograms in the past, or biopsies.  She also had never noted any palpable masses, discharge, or pain in the breasts up to this point.  On 2021 the patient underwent a diagnostic mammogram which revealed grouped forward amorphous calcifications in the left 1:00 to 2:00 region.  A biopsy was performed on 2021 which revealed DCIS, ER positive, LA positive.  Patient was referred to Dr. Dias and she was seen on 2021, she was scheduled for surgery on 2021.  She underwent a left lumpectomy, she did well with this procedure and healed well with no complications other than mild soreness at the incision site (which has now resolved).  Pathology after the lumpectomy determined that she had a grade 1-2 (low to intermediate risk), no lymph node involvement, and clear margins with the closest margin being 2 mm.    Interval History:  Today she is status post XRT.  She completed 4005 cGy in 15 fractions to the left breast.  She started on 2021, and finished on 2021.  She was treated in the prone position to minimize cardiac involvement.  At her last OTV appointment she was doing well with minimal skin reaction.  At her 2-week skin check she was doing good with no complaints.  She continues to do well today.    Subjective       Review of Systems:   Review of Systems   Constitutional: Negative for fatigue.   HENT: Negative for trouble swallowing.    Respiratory: Negative for cough and shortness of breath.    Cardiovascular: Negative for chest pain.   Genitourinary: Negative for breast discharge, breast lump and breast pain.   Skin: Negative for color change.   All other systems reviewed and are negative.      I have reviewed and confirmed the accuracy of the ROS as documented by the MA/LPN/RN LAURI Pulido     The following portions of the patient's history were reviewed and updated as appropriate: allergies, current medications, past family history, past medical history, past social history, past surgical history and problem list.    Past Oncology History:   Oncology/Hematology History   Ductal carcinoma in situ (DCIS) of left breast   11/30/2021 - 12/20/2021 Radiation    Radiation OncologyTreatment Course:  Dodie Kelly received 4005 cGy in 15 fractions to left breast via External Beam Radiation - EBRT.     7/1/2022 Initial Diagnosis    Ductal carcinoma in situ (DCIS) of left breast          PHQ-9 Depression Screening  Little interest or pleasure in doing things?     Feeling down, depressed, or hopeless?     Trouble falling or staying asleep, or sleeping too much?     Feeling tired or having little energy?     Poor appetite or overeating?     Feeling bad about yourself - or that you are a failure or have let yourself or your family down?     Trouble concentrating on things, such as reading the newspaper or watching television?     Moving or speaking so slowly that other people could have noticed? Or the opposite - being so fidgety or restless that you have been moving around a lot more than usual?     Thoughts that you would be better off dead, or of hurting yourself in some way?     PHQ-9 Total Score     If you checked off any problems, how difficult have these problems made it for you to do your work, take care of  things at home, or get along with other people?          KPS: 90%     Results Review:   The following data was reviewed by: LAURI Pulido on 07/07/2022:  Common labs    Common Labsle 10/8/21 10/8/21    0943 0943   Glucose  88   BUN  18   Creatinine  0.78   eGFR Non African Am  74   Sodium  143   Potassium  4.0   Chloride  104   Calcium  9.9   WBC 6.34    Hemoglobin 13.4    Hematocrit 41.6    Platelets 233            Imaging:   DEXA Bone Density Axial    Result Date: 4/12/2022  Impression: 1. According to the World Health Organization definitions of osteoporosis based on bone density, this patient's bone mineral density is compatible with  osteopenia and the fracture risk is moderate.  2. Osteopenia in the lumbar spine, right and left femoral necks, and total right femur.  This report was finalized on 4/12/2022 9:37 AM by Dr. Usama Hudson MD.      Mammo Diagnostic Digital Tomosynthesis Bilateral With CAD    Result Date: 4/12/2022  1. Stable mammographic appearance of the right breast with no findings suspicious for malignancy. 2. Presumed postoperative and posttreatment related changes in the left breast. Recommend short interval follow-up.   BI-RADS CATEGORY:  3, PROBABLY BENIGN   RECOMMENDATION:  Recommend 6 month follow-up diagnostic left mammogram.  CAD was utilized.  The standard false-negative rate of mammography is between 10% and 25%. Complex patterns or increased breast density will markedly elevate the false-negative rate of mammography.    The patient was notified of the results and recommendations at the time of her visit.  Additionally, a letter, in lay terminology, with the results of this exam will be mailed to the patient.  This report was finalized on 4/12/2022 9:40 AM by Dr. Elena Romero MD.       Screening Mammogram- 9/14/2021  FINDINGS:  The breasts are heterogeneously dense, which may obscure  small masses.     The bilateral fibroglandular pattern is stable in appearance.  No  dominant mass or area of architectural distortion is seen.  Calcifications are present in the left upper outer quadrant for which  additional imaging is recommended.     IMPRESSION:  1. Stable mammographic appearance of the right breast with no findings  suspicious for malignancy.  2. Calcifications in the left breast. Recommend additional imaging.        BI-RADS CATEGORY:  0, INCOMPLETE: Need additional imaging evaluation.     RECOMMENDATION:  Left ML and CC magnification views.        Diagnostic Mammogram- 9/16/2021  FINDINGS: Magnification imaging centered over the upper outer quadrant  of the left breast demonstrates a small group of amorphous  calcifications in the left 1:00 to 2:00 region. Recommend stereotactic  guided core biopsy.     IMPRESSION:  Grouped amorphous calcifications in the left 1:00 to 2:00  region.     BI-RADS CATEGORY:  4, SUSPICIOUS     Pathology:  9/22/2021             10/12/2021         Objective     Physical Exam:  Physical Exam  Vitals reviewed.   Constitutional:       Appearance: Normal appearance.   Cardiovascular:      Rate and Rhythm: Normal rate and regular rhythm.      Pulses: Normal pulses.      Heart sounds: Normal heart sounds.   Pulmonary:      Effort: Pulmonary effort is normal.      Breath sounds: Normal breath sounds.   Chest:   Breasts:      Right: Normal.      Left: Normal. No swelling, skin change or tenderness.       Skin:     General: Skin is warm and dry.   Neurological:      General: No focal deficit present.      Mental Status: She is alert and oriented to person, place, and time. Mental status is at baseline.   Psychiatric:         Mood and Affect: Mood normal.         Behavior: Behavior normal.         Thought Content: Thought content normal.         Vital Signs:   Vitals:    07/07/22 1130   BP: 143/77   Pulse: 67   Resp: 18   Temp: 97.1 °F (36.2 °C)   TempSrc: Temporal   SpO2: 98%   Weight: 61.1 kg (134 lb 12.8 oz)     Body mass index is 22.43 kg/m².        Assessment / Plan      Assessment/Plan:   Mrs. Kelly is a pleasant 65 year old who is here today s/p left lumpectomy with Dr. Dias. She was found to have left breast DCIS grade 1-2, ER positive, [place] positive. No lymph node involvement, and clear margins with the closest margin being 2 mm. At this time she declined HRT, she thinks that this is not the right route for her.     Whole Breast radiation was indicated. She recieved hypofractionated WBI to a dose of 4005 Centigray (cGy) in 15 fractions. She was treated in the prone setup in order to decrease any cardiac involvement. She started XRT on 11/30/2021, and finished on 12/20/2021.  At her last OTV appointment she was doing well with minimal skin reaction.  Her 2-week skin check she was doing good with no complaints, and her skin looked really good with no issues.  Her left breast did appear darker than her right.    Her skin looks good today on the affected breast, she is not currently using any lotions/ointments to the breast. No redness noted. No swelling or lymphedema noted. Range of motion in the arm is good. She has no complaints of a cough, shortness of breath, trouble swallowing, or chest pain. LS clear. I instructed the patient on what imaging studies and appointments to expect coming up. Instructed her on late toxicities from radiation including lymphedema, fibrosis, or pneumonitis. Instructed her on the importance of breast massage to reduce the risk of fibrosis and that she should be performing self breast exam at least every 6 months at home. Instructed her to continue arm exercises to maintain good range of motion.  Breast exam today showed no evidence of pain, fibrosis, or any new masses.    She initially refused HRT and is still adamant that she does not want it today.  Dr. Dias performed a diagnostic bilateral mammogram and DEXA scan in April 2022.  Mammogram was negative showing only postop/posttreatment related changes.  She will have a  repeat mammogram on the left breast around October.    We reviewed her survivorship packet today.  Patient understands her treatment plan and is in agreement.  We will see her back in 6 months, sooner if needed.  Instructed the patient that if she has any questions or any new symptoms arise do not hesitate to call us.    Follow Up:   Return in about 6 months (around 1/7/2023) for Office Visit.    LAURI Pulido  07/07/22 12:57 EDT

## 2022-10-11 ENCOUNTER — HOSPITAL ENCOUNTER (OUTPATIENT)
Dept: MAMMOGRAPHY | Facility: HOSPITAL | Age: 67
Discharge: HOME | End: 2022-10-11

## 2022-10-11 DIAGNOSIS — D05.12 BREAST NEOPLASM, TIS (DCIS), LEFT: ICD-10-CM

## 2022-10-11 PROCEDURE — 77065 DX MAMMO INCL CAD UNI: CPT | Performed by: RADIOLOGY

## 2022-10-11 PROCEDURE — G0279 TOMOSYNTHESIS, MAMMO: HCPCS | Performed by: RADIOLOGY

## 2022-10-11 PROCEDURE — G0279 TOMOSYNTHESIS, MAMMO: HCPCS

## 2022-10-11 PROCEDURE — 77065 DX MAMMO INCL CAD UNI: CPT

## 2023-01-09 ENCOUNTER — APPOINTMENT (OUTPATIENT)
Dept: RADIATION ONCOLOGY | Facility: HOSPITAL | Age: 68
End: 2023-01-09
Payer: MEDICARE

## 2023-01-09 ENCOUNTER — OFFICE VISIT (OUTPATIENT)
Dept: RADIATION ONCOLOGY | Facility: HOSPITAL | Age: 68
End: 2023-01-09
Payer: MEDICARE

## 2023-01-09 VITALS
OXYGEN SATURATION: 97 % | BODY MASS INDEX: 21.8 KG/M2 | WEIGHT: 131 LBS | RESPIRATION RATE: 18 BRPM | DIASTOLIC BLOOD PRESSURE: 88 MMHG | SYSTOLIC BLOOD PRESSURE: 148 MMHG | TEMPERATURE: 96.9 F | HEART RATE: 78 BPM

## 2023-01-09 DIAGNOSIS — D05.12 BREAST NEOPLASM, TIS (DCIS), LEFT: Primary | ICD-10-CM

## 2023-01-09 PROCEDURE — 99214 OFFICE O/P EST MOD 30 MIN: CPT

## 2023-01-09 PROCEDURE — G0463 HOSPITAL OUTPT CLINIC VISIT: HCPCS

## 2023-01-09 RX ORDER — OMEPRAZOLE 40 MG/1
40 CAPSULE, DELAYED RELEASE ORAL DAILY
COMMUNITY
Start: 2022-10-27

## 2023-01-09 NOTE — PROGRESS NOTES
Office Follow Up Note      Patient Name: Dodie Kelly  : 1955   MRN: [id number]     Requesting Physician: Estefania Dias MD    Chief Complaint:  Breast Cancer   Staging: Tis DCIS of the left breast, ER/MT Positive    History of Present Illness: Dodie Kelly is a pleasant 67 y.o. female who is here today for follow up after completing radiation therapy.     Mrs. Kelly underwent routine screening mammograms yearly.  At her yearly screening mammogram on 2021 the results revealed left breast calcifications and additional imaging was recommended.  Up to this point the patient had had no positive or suspicious mammograms in the past, or biopsies.  She also had never noted any palpable masses, discharge, or pain in the breasts up to this point.  On 2021 the patient underwent a diagnostic mammogram which revealed grouped forward amorphous calcifications in the left 1:00 to 2:00 region.  A biopsy was performed on 2021 which revealed DCIS, ER positive, [place] positive.  Patient was referred to Dr. Dias and she was seen on 2021, she was scheduled for surgery on 2021.  She underwent a left lumpectomy, she did well with this procedure and healed well with no complications other than mild soreness at the incision site (which has now resolved).  Pathology after the lumpectomy determined that she had a grade 1-2 (low to intermediate risk), no lymph node involvement, and clear margins with the closest margin being 2 mm.    Interval History:  Today she is status post XRT.  She completed 4005 cGy in 15 fractions to the left breast.  She started on 2021, and finished on 2021.  She was treated in the prone position to minimize cardiac involvement.    She done well throughout treatment with no major issues.  She continues to do well today.    Subjective      Review of Systems:   Review of Systems   Constitutional: Negative for fatigue.    HENT: Negative for trouble swallowing.    Respiratory: Negative for cough and shortness of breath.    Cardiovascular: Negative for chest pain and palpitations.   Genitourinary: Negative for breast discharge, breast lump and breast pain.   Skin: Negative for color change and dry skin.   All other systems reviewed and are negative.      I have reviewed and confirmed the accuracy of the ROS as documented by the MA/LPN/RN LAURI Pulido     The following portions of the patient's history were reviewed and updated as appropriate: allergies, current medications, past family history, past medical history, past social history, past surgical history and problem list.    Past Oncology History:   Oncology/Hematology History   Ductal carcinoma in situ (DCIS) of left breast   11/30/2021 - 12/20/2021 Radiation    Radiation OncologyTreatment Course:  Dodie Kelly received 4005 cGy in 15 fractions to left breast via External Beam Radiation - EBRT.     7/1/2022 Initial Diagnosis    Ductal carcinoma in situ (DCIS) of left breast          PHQ-9 Depression Screening  Little interest or pleasure in doing things?     Feeling down, depressed, or hopeless?     Trouble falling or staying asleep, or sleeping too much?     Feeling tired or having little energy?     Poor appetite or overeating?     Feeling bad about yourself - or that you are a failure or have let yourself or your family down?     Trouble concentrating on things, such as reading the newspaper or watching television?     Moving or speaking so slowly that other people could have noticed? Or the opposite - being so fidgety or restless that you have been moving around a lot more than usual?     Thoughts that you would be better off dead, or of hurting yourself in some way?     PHQ-9 Total Score     If you checked off any problems, how difficult have these problems made it for you to do your work, take care of things at home, or get along with other people?           KPS: 90%     Results Review:   The following data was reviewed by: LAURI Pulido on 07/07/2022:    Imaging:   Mammo Diagnostic Digital Tomosynthesis Left With CAD    Result Date: 10/11/2022  Probably benign left mammographic findings. Recommend continued follow-up.   BI-RADS CATEGORY:  3, PROBABLY BENIGN   RECOMMENDATION:  The patient will be due for her next bilateral diagnostic after 04/12/2023.  CAD was utilized.  The standard false-negative rate of mammography is between 10% and 25%. Complex patterns or increased breast density will markedly elevate the false-negative rate of mammography.    The patient was notified of the results and recommendations at the time of her visit.  Additionally, a letter, in lay terminology, with the results of this exam will be mailed to the patient.  This report was finalized on 10/11/2022 11:08 AM by Dr. Elena Romero MD.       Screening Mammogram- 9/14/2021  FINDINGS:  The breasts are heterogeneously dense, which may obscure  small masses.     The bilateral fibroglandular pattern is stable in appearance. No  dominant mass or area of architectural distortion is seen.  Calcifications are present in the left upper outer quadrant for which  additional imaging is recommended.     IMPRESSION:  1. Stable mammographic appearance of the right breast with no findings  suspicious for malignancy.  2. Calcifications in the left breast. Recommend additional imaging.        BI-RADS CATEGORY:  0, INCOMPLETE: Need additional imaging evaluation.     RECOMMENDATION:  Left ML and CC magnification views.        Diagnostic Mammogram- 9/16/2021  FINDINGS: Magnification imaging centered over the upper outer quadrant  of the left breast demonstrates a small group of amorphous  calcifications in the left 1:00 to 2:00 region. Recommend stereotactic  guided core biopsy.     IMPRESSION:  Grouped amorphous calcifications in the left 1:00 to 2:00  region.     BI-RADS CATEGORY:  4,  SUSPICIOUS     Pathology:  9/22/2021             10/12/2021         Objective     Physical Exam:  Physical Exam  Vitals reviewed.   Constitutional:       Appearance: Normal appearance.   Cardiovascular:      Pulses: Normal pulses.   Pulmonary:      Effort: Pulmonary effort is normal. No respiratory distress.   Chest:      Chest wall: No tenderness.   Breasts:     Right: Normal.      Left: Normal. No swelling, skin change or tenderness.   Lymphadenopathy:      Upper Body:      Left upper body: No axillary adenopathy.   Skin:     General: Skin is warm and dry.   Neurological:      General: No focal deficit present.      Mental Status: She is alert and oriented to person, place, and time. Mental status is at baseline.   Psychiatric:         Mood and Affect: Mood normal.         Behavior: Behavior normal.         Thought Content: Thought content normal.       Vital Signs:   Vitals:    01/09/23 0951   BP: 148/88   Pulse: 78   Resp: 18   Temp: 96.9 °F (36.1 °C)   TempSrc: Temporal   SpO2: 97%   Weight: 59.4 kg (131 lb)   PainSc: 0-No pain     Body mass index is 21.8 kg/m².     Assessment / Plan      Assessment/Plan:   Mrs. Kelly is a pleasant 65 year old who is here today s/p left lumpectomy with Dr. Dias. She was found to have left breast DCIS grade 1-2, ER positive, [place] positive. No lymph node involvement, and clear margins with the closest margin being 2 mm. At this time she declined HRT, she thinks that this is not the right route for her.     Whole Breast radiation was indicated. She recieved hypofractionated WBI to a dose of 4005 Centigray (cGy) in 15 fractions. She was treated in the prone setup in order to decrease any cardiac involvement. She started XRT on 11/30/2021, and finished on 12/20/2021.    She done well throughout all treatment with no major issues.    Her skin looks good today on the affected breast, she is not currently using any lotions/ointments to the breast. No redness noted. No swelling or  lymphedema noted. Range of motion in the arm is good. She has no complaints of a cough, shortness of breath, trouble swallowing, palpitations, or chest pain. LS clear. I instructed the patient on what imaging studies and appointments to expect coming up. Instructed her on late toxicities from radiation including lymphedema, fibrosis, or pneumonitis. Instructed her on the importance of breast massage to reduce the risk of fibrosis and that she should be performing self breast exam at least every 6 months at home. Instructed her to continue arm exercises to maintain good range of motion.  Breast exam today showed no evidence of pain, lymphedema, fibrosis, or any new masses.    She patient still does not want to move forward with HRT.  Dr. Dias performed a diagnostic bilateral mammogram and DEXA scan in April 2022.  Mammogram was negative showing only postop/posttreatment related changes.  DEXA was negative.  A repeat mammogram on 10/11/2022 to the left breast showed again only postop/posttreatment related changes.  We will repeat a bilateral mammogram in April 2023.      We will see her back in 6 months, sooner if needed.  Instructed the patient that if she has any questions or any new symptoms arise do not hesitate to call us.    Follow Up:   Return in about 6 months (around 7/9/2023) for Office Visit, Imaging - See orders.    Lucy Helms, LAURI  01/09/23 10:11 EST

## 2023-02-07 DIAGNOSIS — D05.12 BREAST NEOPLASM, TIS (DCIS), LEFT: Primary | ICD-10-CM

## 2023-03-30 ENCOUNTER — HOSPITAL ENCOUNTER (OUTPATIENT)
Dept: MAMMOGRAPHY | Facility: HOSPITAL | Age: 68
Discharge: HOME | End: 2023-03-30
Admitting: SURGERY
Payer: MEDICARE

## 2023-03-30 DIAGNOSIS — D05.12 BREAST NEOPLASM, TIS (DCIS), LEFT: ICD-10-CM

## 2023-03-30 PROCEDURE — G0279 TOMOSYNTHESIS, MAMMO: HCPCS | Performed by: RADIOLOGY

## 2023-03-30 PROCEDURE — 77066 DX MAMMO INCL CAD BI: CPT | Performed by: RADIOLOGY

## 2023-03-30 PROCEDURE — G0279 TOMOSYNTHESIS, MAMMO: HCPCS

## 2023-03-30 PROCEDURE — 77066 DX MAMMO INCL CAD BI: CPT

## 2023-04-20 ENCOUNTER — OFFICE VISIT (OUTPATIENT)
Dept: SURGERY | Facility: CLINIC | Age: 68
End: 2023-04-20
Payer: MEDICARE

## 2023-04-20 VITALS
DIASTOLIC BLOOD PRESSURE: 78 MMHG | BODY MASS INDEX: 22.59 KG/M2 | HEIGHT: 65 IN | WEIGHT: 135.6 LBS | HEART RATE: 70 BPM | SYSTOLIC BLOOD PRESSURE: 136 MMHG

## 2023-04-20 DIAGNOSIS — D05.12 BREAST NEOPLASM, TIS (DCIS), LEFT: Primary | ICD-10-CM

## 2023-04-20 DIAGNOSIS — M85.851 OSTEOPENIA OF NECK OF RIGHT FEMUR: ICD-10-CM

## 2023-04-20 PROCEDURE — 1159F MED LIST DOCD IN RCRD: CPT | Performed by: SURGERY

## 2023-04-20 PROCEDURE — 99213 OFFICE O/P EST LOW 20 MIN: CPT | Performed by: SURGERY

## 2023-04-20 PROCEDURE — 1160F RVW MEDS BY RX/DR IN RCRD: CPT | Performed by: SURGERY

## 2023-04-20 NOTE — PROGRESS NOTES
Subjective   Dodie Kelly is a 67 y.o. female here today for mammogram review.    History of Present Illness   was seen in the office today for breast cancer follow-up.  She is status post a left lumpectomy for DCIS on 10/12/2021.  Patient completed a course of radiation.    She declined hormonal therapy.  Ms. Kelly presents to the office today having had a bilateral mammogram with tomosynthesis on 3/30/2023 which demonstrated no changes over prior.  A left mammogram in 6 months was recommended.  She denies any palpable abnormalities in the breast or the axilla.  She denies any arm edema.  She denies any changes in her health.   Bone density was performed on 4/12/2022 which demonstrated osteopenia with a T score of -1.9.  Patient states she is taking calcium and vitamin D  Allergies   Allergen Reactions   • Bactrim [Sulfamethoxazole-Trimethoprim] Other (See Comments)     Passed out   • Lisinopril Cough   • Losartan Rash       Current Outpatient Medications   Medication Sig Dispense Refill   • amLODIPine (NORVASC) 5 MG tablet Take 1 tablet by mouth Daily.     • Omega-3 Fatty Acids (fish oil) 1000 MG capsule capsule Take  by mouth Daily With Breakfast.     • omeprazole (priLOSEC) 40 MG capsule Take 1 capsule by mouth Daily.     • Vitamin D, Cholecalciferol, (CHOLECALCIFEROL) 10 MCG (400 UNIT) tablet Take 1 tablet by mouth Daily.     • HYDROcodone-acetaminophen (Norco) 7.5-325 MG per tablet Take 1 tablet by mouth 4 (Four) Times a Day As Needed for Moderate Pain. 8 tablet 0     No current facility-administered medications for this visit.     Past Medical History:   Diagnosis Date   • Breast cancer 2021    left   • Cancer     breast   • GERD (gastroesophageal reflux disease)    • Hypertension      Past Surgical History:   Procedure Laterality Date   • BREAST BIOPSY Left 10/12/2021    Procedure: BREAST BIOPSY WITH NEEDLE LOCALIZATION;  Surgeon: Estefania Dias MD;  Location: [facility];  Service: General;   "Laterality: Left;   • BREAST LUMPECTOMY Left 10/12/2021    [place]   • COLONOSCOPY         Pertinent Review of Systems    Objective   /78 (BP Location: Left arm)   Pulse 70   Ht 165.1 cm (65\")   Wt 61.5 kg (135 lb 9.6 oz)   BMI 22.57 kg/m²    Physical Exam  Well-developed well-nourished female no acute distress.  Neck:  No supraclavicular adenopathy  Lungs:  Respiratory effort normal. Auscultation: Clear, without wheezes, rhonchi, rales  Heart:  Regular rate and rhythm, without murmur, gallop, rub.  No pedal edema  Breasts: On visual inspection the breasts are symmetrical with the exception of some hyperpigmentation of the left breast .  Examination of the right breast demonstrates no discrete mass, skin change, or axillary adenopathy.  Examination of the left breast demonstrates a healed surgical scar in the upper outer quadrant.  The tissue is dense.  There is no palpable mass or adenopathy..   Upper extremity: Without edema  Procedures     Results/Data:  Imaging: Mammogram reports and images from 3/30/2023 were reviewed.  I agree with the assessment    Assessment & Plan   DCIS of the left breast, ER positive, CO positive.  Status post lumpectomy with radiation     Plan: Patient declined hormonal therapy.  Ms. Kelly will be scheduled for a left mammogram in October, 2023 with return to the office following  Next bone density will be due April, 2024       Discussion/Summary:    Time spent:     BMI is within normal parameters. No other follow-up for BMI required.         Future Appointments   Date Time Provider Department Center   7/10/2023 10:00 AM Lucy Helms APRN [facility]         Please note that portions of this note were completed with a voice recognition program.  "

## 2023-04-21 PROBLEM — M85.851 OSTEOPENIA OF NECK OF RIGHT FEMUR: Status: ACTIVE | Noted: 2023-04-21

## 2023-07-20 ENCOUNTER — HOSPITAL ENCOUNTER (OUTPATIENT)
Dept: RADIATION ONCOLOGY | Facility: HOSPITAL | Age: 68
Setting detail: RADIATION/ONCOLOGY SERIES
End: 2023-07-20
Payer: MEDICARE

## 2023-10-03 ENCOUNTER — HOSPITAL ENCOUNTER (OUTPATIENT)
Dept: MAMMOGRAPHY | Facility: HOSPITAL | Age: 68
Discharge: HOME | End: 2023-10-03
Admitting: SURGERY
Payer: MEDICARE

## 2023-10-03 DIAGNOSIS — D05.12 BREAST NEOPLASM, TIS (DCIS), LEFT: ICD-10-CM

## 2023-10-03 PROCEDURE — G0279 TOMOSYNTHESIS, MAMMO: HCPCS

## 2023-10-03 PROCEDURE — 77065 DX MAMMO INCL CAD UNI: CPT

## 2023-11-16 ENCOUNTER — PATIENT ROUNDING (BHMG ONLY) (OUTPATIENT)
Dept: SURGERY | Facility: CLINIC | Age: 68
End: 2023-11-16
Payer: MEDICARE

## 2023-11-16 ENCOUNTER — OFFICE VISIT (OUTPATIENT)
Dept: SURGERY | Facility: CLINIC | Age: 68
End: 2023-11-16
Payer: MEDICARE

## 2023-11-16 VITALS
WEIGHT: 138.4 LBS | DIASTOLIC BLOOD PRESSURE: 84 MMHG | SYSTOLIC BLOOD PRESSURE: 122 MMHG | BODY MASS INDEX: 23.06 KG/M2 | HEART RATE: 80 BPM | HEIGHT: 65 IN

## 2023-11-16 DIAGNOSIS — Z78.0 POST-MENOPAUSE: Primary | ICD-10-CM

## 2023-11-16 DIAGNOSIS — D05.12 BREAST NEOPLASM, TIS (DCIS), LEFT: Primary | ICD-10-CM

## 2023-11-16 DIAGNOSIS — D05.12 BREAST NEOPLASM, TIS (DCIS), LEFT: ICD-10-CM

## 2023-11-16 DIAGNOSIS — M85.851 OSTEOPENIA OF NECK OF RIGHT FEMUR: ICD-10-CM

## 2023-11-16 PROCEDURE — 1160F RVW MEDS BY RX/DR IN RCRD: CPT | Performed by: SURGERY

## 2023-11-16 PROCEDURE — 1159F MED LIST DOCD IN RCRD: CPT | Performed by: SURGERY

## 2023-11-16 PROCEDURE — 99213 OFFICE O/P EST LOW 20 MIN: CPT | Performed by: SURGERY

## 2023-11-16 NOTE — PROGRESS NOTES
Subjective   Dodie Kelly is a 68 y.o. female Mammogram follow up.    History of Present Illness   was seen in the office today for breast cancer follow-up.  She is status post a left lumpectomy for DCIS on 10/12/2021.  Patient completed a course of radiation.    She declined hormonal therapy.  Ms. Kelly presents to the office today having had a left mammogram with tomosynthesis on 10/3/2023 which demonstrated no changes over prior.  A bilateral mammogram in 6 months was recommended.  She denies any palpable abnormalities in the breast or the axilla.  She denies any arm edema.  She denies any changes in her health.   Bone density was performed on 4/12/2022 which demonstrated osteopenia with a T score of -1.9.  Patient states she is taking calcium and vitamin D  Allergies   Allergen Reactions    Bactrim [Sulfamethoxazole-Trimethoprim] Other (See Comments)     Passed out    Lisinopril Cough    Losartan Rash       Current Outpatient Medications   Medication Sig Dispense Refill    amLODIPine (NORVASC) 5 MG tablet Take 1 tablet by mouth Daily.      Omega-3 Fatty Acids (fish oil) 1000 MG capsule capsule Take  by mouth Daily With Breakfast.      omeprazole (priLOSEC) 40 MG capsule Take 1 capsule by mouth Daily.      Vitamin D, Cholecalciferol, (CHOLECALCIFEROL) 10 MCG (400 UNIT) tablet Take 1 tablet by mouth Daily.      calcium carbonate (TUMS) 500 MG chewable tablet Chew 2 tablets Daily.       No current facility-administered medications for this visit.     Past Medical History:   Diagnosis Date    Breast cancer 2021    left    Cancer     breast    GERD (gastroesophageal reflux disease)     Hypertension      Past Surgical History:   Procedure Laterality Date    BREAST BIOPSY Left 10/12/2021    Procedure: BREAST BIOPSY WITH NEEDLE LOCALIZATION;  Surgeon: Estefania Dias MD;  Location: [facility];  Service: General;  Laterality: Left;    BREAST LUMPECTOMY Left 10/12/2021    [place]    COLONOSCOPY    "      Pertinent Review of Systems  Negative for chest pain or shortness of breath    Objective   /84 (BP Location: Right arm)   Pulse 80   Ht 165.1 cm (65\")   Wt 62.8 kg (138 lb 6.4 oz)   BMI 23.03 kg/m²    Physical Exam  Well-developed well-nourished female no acute distress.  Neck:  No supraclavicular adenopathy  Lungs:  Respiratory effort normal. Auscultation: Clear, without wheezes, rhonchi, rales  Heart:  Regular rate and rhythm, without murmur, gallop, rub.  No pedal edema  Breasts: On visual inspection the breasts are symmetrical.  Examination of the right breast demonstrates no discrete mass, skin change, or axillary adenopathy.  Examination of the left breast demonstrates a healed surgical scar in the upper outer quadrant.  The tissue is dense.  There is no palpable mass or adenopathy..   Upper extremity: Without edema  Procedures     Results/Data:  Imaging: Mammogram reports and images from 10/3/2023  Reviewed.  I agree with the assessment      Assessment & Plan   DCIS of the left breast, ER positive, [place] positive.  Status post lumpectomy with radiation  Patient declined hormonal therapy.    Plan:  Ms. Kelly will be scheduled for a bilateral mammogram and bone density after April 12  Return to the office following all       Discussion/Summary:    Time spent:     BMI is within normal parameters. No other follow-up for BMI required.         Future Appointments   Date Time Provider Department Center   1/18/2024  2:00 PM Nimesh Mitchell MD [facility]       Please note that portions of this note were completed with a voice recognition program.  "

## 2023-11-16 NOTE — PROGRESS NOTES
November 16, 2023    Hello, may I speak with Dodie Kelly?    My name is Ann-Marie Phillips      I am  with [facility]  [address] [place] [address]  [place] [address] [phone].    Before we get started may I verify your date of birth? 1955    I am calling to officially welcome you to our practice and ask about your recent visit. Is this a good time to talk? yes    Tell me about your visit with us. What things went well?  Satisfied with her visits       We're always looking for ways to make our patients' experiences even better. Do you have recommendations on ways we may improve?  no    Overall were you satisfied with your first visit to our practice? yes       I appreciate you taking the time to speak with me today. Is there anything else I can do for you? no      Thank you, and have a great day.

## 2024-02-01 ENCOUNTER — OFFICE VISIT (OUTPATIENT)
Dept: RADIATION ONCOLOGY | Facility: HOSPITAL | Age: 69
End: 2024-02-01
Payer: MEDICARE

## 2024-02-01 VITALS
HEART RATE: 74 BPM | RESPIRATION RATE: 18 BRPM | SYSTOLIC BLOOD PRESSURE: 135 MMHG | BODY MASS INDEX: 23.5 KG/M2 | WEIGHT: 141.2 LBS | OXYGEN SATURATION: 99 % | TEMPERATURE: 98 F | DIASTOLIC BLOOD PRESSURE: 79 MMHG

## 2024-02-01 DIAGNOSIS — D05.12 BREAST NEOPLASM, TIS (DCIS), LEFT: Primary | ICD-10-CM

## 2024-02-01 PROCEDURE — G0463 HOSPITAL OUTPT CLINIC VISIT: HCPCS | Performed by: RADIOLOGY

## 2024-02-01 NOTE — PROGRESS NOTES
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    History Summary:  Mrs. Dodie Kelly is a 68-year-old lady with the above diagnosis.  The patient underwent left breast conservation surgery on 10/12/2021.  [place] lymph nodes were not biopsied.  The malignancy was positive for estrogen and progesterone receptors.  Mrs. Kelly completed a 4005 cGy regimen to the left breast on 12/20/2021.  Breast radiotherapy was well-tolerated.    The patient returns today for follow-up evaluation.  Mrs. Kelly is being followed closely by Dr. Dias.  The patient has declined endocrine therapy.  Mrs. Kelly denies side effects from breast surgery or radiotherapy.  She is pleased with the excellent cosmetic result that has been achieved.  The patient denies detection of new breast masses, detection of regional adenopathy, chest wall discomfort, rib pain, left arm swelling and pulmonary symptoms.    Imaging Studies:  Bilateral diagnostic mammograms (03/30/2023) revealed postoperative changes in the left breast.  No findings worrisome for cancer recurrence were noted.    Diagnostic mammogram of the left breast (10/03/2023) showed the left breast to be heterogeneously dense with postoperative changes noted in the left upper outer quadrant.  Stable scattered microcalcifications were noted.  No new or suspicious findings were identified.    Review of Systems:      A comprehensive 14 point review of systems was negative.    Past Medical History:   Diagnosis Date    Breast cancer 2021    left    Cancer     breast    GERD (gastroesophageal reflux disease)     Hypertension         Past Surgical History:   Procedure Laterality Date    BREAST BIOPSY  Left 10/12/2021    Procedure: BREAST BIOPSY WITH NEEDLE LOCALIZATION;  Surgeon: Estefania Dias MD;  Location: [facility] OR;  Service: General;  Laterality: Left;    BREAST LUMPECTOMY Left 10/12/2021    [place]    COLONOSCOPY        Family History   Problem Relation Age of Onset    Hypertension Mother     Heart disease Father     Hypertension Sister     Hypertension Daughter     Cancer Maternal Grandmother         colon    Cancer Brother     Breast cancer Neg Hx         Social History     Socioeconomic History    Marital status:    Tobacco Use    Smoking status: Never    Smokeless tobacco: Never   Vaping Use    Vaping Use: Never used   Substance and Sexual Activity    Alcohol use: Never    Drug use: Never    Sexual activity: Defer     Allergies:  Bactrim [sulfamethoxazole-trimethoprim], Lisinopril, and Losartan   Prior to Admission medications    Medication Sig Start Date End Date Taking? Authorizing Provider   amLODIPine (NORVASC) 5 MG tablet Take 1 tablet by mouth Daily. 8/3/21  Yes Rey Carmen MD   Omega-3 Fatty Acids (fish oil) 1000 MG capsule capsule Take  by mouth Daily With Breakfast.   Yes Rey Carmen MD   omeprazole (priLOSEC) 40 MG capsule Take 1 capsule by mouth Daily. 10/27/22  Yes Rey Carmen MD   Vitamin D, Cholecalciferol, (CHOLECALCIFEROL) 10 MCG (400 UNIT) tablet Take 1 tablet by mouth Daily.   Yes Rey Carmen MD   calcium carbonate (TUMS) 500 MG chewable tablet Chew 2 tablets Daily.    ProviderRey MD      Pain:(on a scale of 0-10)    Pain Score    24 1402   PainSc: 0-No pain      Dodie Kelly reports a pain score of 0.  .     Quality of Life:   KPS: 100  ECO    Physical Examination:  Vitals: Blood pressure 135/79, pulse 74, respirations 18, temperature 98 °F, pulse oximetry on room air 99%  Weight: Weight: 64 kg (141 lb 3.2 oz) Body mass index is 23.5 kg/m².      Constitutional: The patient is a well-developed, well-nourished white  "female in no acute distress.  Alert and oriented ×3.  HEENT: Atraumatic. Normocephalic. No abnormalities noted.  Lymphatics: No cervical, supraclavicular, or axillary, or inguinal lymphadenopathy is palpated.  CV: Regular rate and rhythm.  No murmurs, rubs, or gallops are appreciated.  Respiratory: Lungs clear to auscultation.  Breath sounds equal bilaterally.  Breasts: Surgical scars in the breast breast are well healed, and there are no suspicious lesions or nodules palpated. The right breast breast is within normal limits, with no suspicious lesions or nodules palpated.  GI: Abdomen soft, nontender, nondistended, with no hepatosplenomegaly or masses palpated.  Extremities: No clubbing, cyanosis, or edema.  Neurologic: Cranial nerves II through XII are grossly intact, with no focal neurological deficits noted on exam.  Psychiatric: Alert and oriented x3. Normal affect, with no anxiety or depression noted.    Radiographs:  Described above    Pathology:   Described above    Labs:   Lab Results   Component Value Date    GLUCOSE 88 10/08/2021    BUN 18 10/08/2021    CREATININE 0.78 10/08/2021    BCR 23.1 10/08/2021    K 4.0 10/08/2021    CO2 28.4 10/08/2021    CALCIUM 9.9 10/08/2021      WBC   Date Value Ref Range Status   10/08/2021 6.34 3.40 - 10.80 10*3/mm3 Final     Hemoglobin   Date Value Ref Range Status   10/08/2021 13.4 12.0 - 15.9 g/dL Final     Hematocrit   Date Value Ref Range Status   10/08/2021 41.6 34.0 - 46.6 % Final     Platelets   Date Value Ref Range Status   10/08/2021 233 140 - 450 10*3/mm3 Final    No results found for: \"PSA\" No results found for: \"CEA\"     Assessment:  Low-grade ductal carcinoma in situ of the left breast status post breast conservation surgery and postoperative radiotherapy.    The patient is clinically and radiographically no evidence of disease    Recommendations:  Mrs. Kelly is scheduled for a DEXA bone density study and bilateral diagnostic mammograms in April 2024.  The " patient will review the imaging reports with Dr. Dias.  Mrs. Kelly was instructed to perform breast and regional lymph node self assessments on a regular basis.  She should alert her physicians should she note any new breast masses or adenopathy.  The patient will return for a recheck by our service in 1 year.    Time spent with patient 30 minutes (the total time included previsit review of medical records and imaging studies, obtaining an updated history of present illness, performing an appropriate medical examination/evaluation, and patient counseling).    cc:   Estefania Dias MD    Electronically signed by Nimesh Mitchell MD 2/1/2024  14:51 EST

## 2024-02-15 ENCOUNTER — TELEPHONE (OUTPATIENT)
Dept: SURGERY | Facility: CLINIC | Age: 69
End: 2024-02-15
Payer: MEDICARE

## 2024-04-11 ENCOUNTER — OFFICE VISIT (OUTPATIENT)
Dept: FAMILY MEDICINE CLINIC | Facility: CLINIC | Age: 69
End: 2024-04-11
Payer: MEDICARE

## 2024-04-11 VITALS
TEMPERATURE: 97.8 F | WEIGHT: 138.6 LBS | OXYGEN SATURATION: 97 % | HEIGHT: 65 IN | SYSTOLIC BLOOD PRESSURE: 132 MMHG | BODY MASS INDEX: 23.09 KG/M2 | DIASTOLIC BLOOD PRESSURE: 78 MMHG | HEART RATE: 69 BPM

## 2024-04-11 DIAGNOSIS — K21.9 GASTROESOPHAGEAL REFLUX DISEASE WITHOUT ESOPHAGITIS: ICD-10-CM

## 2024-04-11 DIAGNOSIS — Z23 NEED FOR VACCINATION: ICD-10-CM

## 2024-04-11 DIAGNOSIS — Z00.00 HEALTH CARE MAINTENANCE: ICD-10-CM

## 2024-04-11 DIAGNOSIS — I10 PRIMARY HYPERTENSION: Primary | ICD-10-CM

## 2024-04-11 DIAGNOSIS — E55.9 VITAMIN D DEFICIENCY: ICD-10-CM

## 2024-04-11 DIAGNOSIS — D05.12 DUCTAL CARCINOMA IN SITU (DCIS) OF LEFT BREAST: ICD-10-CM

## 2024-04-11 PROCEDURE — 84443 ASSAY THYROID STIM HORMONE: CPT | Performed by: INTERNAL MEDICINE

## 2024-04-11 PROCEDURE — 82306 VITAMIN D 25 HYDROXY: CPT | Performed by: INTERNAL MEDICINE

## 2024-04-11 PROCEDURE — 85027 COMPLETE CBC AUTOMATED: CPT | Performed by: INTERNAL MEDICINE

## 2024-04-11 PROCEDURE — 80061 LIPID PANEL: CPT | Performed by: INTERNAL MEDICINE

## 2024-04-11 PROCEDURE — 80053 COMPREHEN METABOLIC PANEL: CPT | Performed by: INTERNAL MEDICINE

## 2024-04-11 PROCEDURE — 86803 HEPATITIS C AB TEST: CPT | Performed by: INTERNAL MEDICINE

## 2024-04-11 RX ORDER — AMLODIPINE BESYLATE 5 MG/1
5 TABLET ORAL DAILY
Qty: 90 TABLET | Refills: 1 | Status: SHIPPED | OUTPATIENT
Start: 2024-04-11

## 2024-04-11 RX ORDER — PHENOL 1.4 %
600 AEROSOL, SPRAY (ML) MUCOUS MEMBRANE DAILY
COMMUNITY

## 2024-04-11 RX ORDER — OMEPRAZOLE 20 MG/1
20 CAPSULE, DELAYED RELEASE ORAL DAILY
Qty: 90 CAPSULE | Refills: 1 | Status: SHIPPED | OUTPATIENT
Start: 2024-04-11

## 2024-04-11 NOTE — PROGRESS NOTES
Venipuncture Blood Specimen Collection  Venipuncture performed in right arm by Heather Symes, MA with good hemostasis. Patient tolerated the procedure well without complications.   04/11/24   Heather Symes, MA    Immunization  Immunization performed in right deltoid by Heather Symes, MA. Patient tolerated the procedure well without complications.  04/11/24   Heather Symes, MA

## 2024-04-11 NOTE — PROGRESS NOTES
Patient Name: Dodie Kelly Today's Date: 2024   Patient MRN / CSN: [id number] / [id number] Date of Encounter: 2024   Patient Age / : 68 y.o. / 1955 Encounter Provider: Fany Wilson DO   Referring Physician: No ref. provider found          Dodie is a 68 y.o. female who is being seen today for Establish Care (States she is feeling good today, no complaints at this time. ), Hypertension, and Heartburn    Dodie presents today to establish care with a medical history including DCIS of the left breast, ER positive, ID positive, status postlumpectomy with radiation therapy.  Patient declined hormonal therapy.  She is following closely with her general surgeon and radiation oncologist and is planning to update mammogram with bone density testing next month.  She also has a medical history of hypertension and gastroesophageal reflux disease.  She reports feeling well today.    Hypertension  This is a chronic problem. The current episode started more than 1 month ago. The problem has been stable since onset. The problem is controlled. Pertinent negatives include no chest pain or shortness of breath. Current antihypertension treatment includes calcium channel blockers. The current treatment provides significant improvement. There are no compliance problems.    Heartburn  She complains of heartburn. She reports no abdominal pain or no chest pain. This is a chronic problem. The problem occurs rarely (since taking omeprazole). She has tried a PPI for the symptoms. The treatment provided significant relief.       Allergies include:Bactrim [sulfamethoxazole-trimethoprim], Lisinopril, and Losartan  Current Outpatient Medications   Medication Sig Dispense Refill    amLODIPine (NORVASC) 5 MG tablet Take 1 tablet by mouth Daily. 90 tablet 1    calcium carbonate (OS-[place]) 600 MG tablet Take 1 tablet by mouth Daily. 2 a day      Omega-3 Fatty Acids (fish oil) 1000 MG capsule capsule Take  by mouth Daily  "With Breakfast.      omeprazole (priLOSEC) 20 MG capsule Take 1 capsule by mouth Daily. 90 capsule 1    Vitamin D, Cholecalciferol, (CHOLECALCIFEROL) 10 MCG (400 UNIT) tablet Take 1 tablet by mouth Daily.       No current facility-administered medications for this visit.     Past Medical History:   Diagnosis Date    Breast cancer 2021    left    Cancer     breast    GERD (gastroesophageal reflux disease)     Hypertension      Family History   Problem Relation Age of Onset    Hypertension Mother     Heart disease Father     Hypertension Sister     Hypertension Daughter     Cancer Maternal Grandmother         colon    Cancer Brother     Breast cancer Neg Hx      Past Surgical History:   Procedure Laterality Date    BREAST BIOPSY Left 10/12/2021    Procedure: BREAST BIOPSY WITH NEEDLE LOCALIZATION;  Surgeon: Estefania Dias MD;  Location: [facility];  Service: General;  Laterality: Left;    BREAST LUMPECTOMY Left 10/12/2021    [place]    COLONOSCOPY       Social History     Substance and Sexual Activity   Alcohol Use Never     Social History     Tobacco Use   Smoking Status Never   Smokeless Tobacco Never     Social History     Substance and Sexual Activity   Drug Use Never     Review of Systems   Respiratory:  Negative for shortness of breath.    Cardiovascular:  Negative for chest pain.        Rare palpitations, maybe once a month and brief, nonlimiting    Gastrointestinal:  Positive for heartburn. Negative for abdominal pain and blood in stool.        Reflux well controlled, interested in trying a lower dose of omeprazole.    Genitourinary:  Negative for hematuria.        Depression Assessment Review:  PHQ-9 Total Score: 0  Vital Signs & Measurements Taken This Encounter  /78 (BP Location: Right arm, Patient Position: Sitting, Cuff Size: Adult)   Pulse 69   Temp 97.8 °F (36.6 °C) (Temporal)   Ht 165.1 cm (65\")   Wt 62.9 kg (138 lb 9.6 oz)   SpO2 97%   BMI 23.06 kg/m²    SpO2 Percentage    04/11/24 1349 "   SpO2: 97%        BMI is within normal parameters. No other follow-up for BMI required.      Physical Exam  Vitals reviewed.   Constitutional:       General: She is not in acute distress.  HENT:      Head: Normocephalic and atraumatic.   Eyes:      General: No scleral icterus.     Extraocular Movements: Extraocular movements intact.      Conjunctiva/sclera: Conjunctivae normal.      Pupils: Pupils are equal, round, and reactive to light.   Cardiovascular:      Rate and Rhythm: Normal rate and regular rhythm.   Pulmonary:      Effort: Pulmonary effort is normal. No respiratory distress.      Breath sounds: Normal breath sounds. No wheezing or rhonchi.   Abdominal:      Palpations: Abdomen is soft.      Tenderness: There is no abdominal tenderness.   Musculoskeletal:         General: No swelling.      Cervical back: Neck supple. No tenderness.   Lymphadenopathy:      Cervical: No cervical adenopathy.   Skin:     General: Skin is warm and dry.      Coloration: Skin is not jaundiced.   Neurological:      Mental Status: She is alert.   Psychiatric:         Mood and Affect: Mood normal.         Behavior: Behavior normal.         Thought Content: Thought content normal.         Judgment: Judgment normal.              Assessment & Plan  Patient Active Problem List   Diagnosis    Breast neoplasm, Tis (DCIS), left    Ductal carcinoma in situ (DCIS) of left breast    Osteopenia of neck of right femur    Primary hypertension    Gastroesophageal reflux disease without esophagitis    Vitamin D deficiency       ICD-10-CM ICD-9-CM   1. Primary hypertension  I10 401.9   2. Gastroesophageal reflux disease without esophagitis  K21.9 530.81   3. Vitamin D deficiency  E55.9 268.9   4. Ductal carcinoma in situ (DCIS) of left breast  D05.12 233.0   5. Health care maintenance  Z00.00 V70.0   6. Need for vaccination  Z23 V05.9     Orders Placed This Encounter   Procedures    Pneumococcal Conjugate Vaccine 20-Valent (PCV20)    CBC (No Diff)      Order Specific Question:   Release to patient     Answer:   Routine Release [[id number]]    Comprehensive Metabolic Panel     Order Specific Question:   Release to patient     Answer:   Routine Release [[id number]]    Lipid Panel     Order Specific Question:   Release to patient     Answer:   Routine Release [[id number]]    TSH     Order Specific Question:   Release to patient     Answer:   Routine Release [[id number]]    Vitamin D,25-Hydroxy     Order Specific Question:   Release to patient     Answer:   Routine Release [[id number]]    Hepatitis C Antibody     Order Specific Question:   Release to patient     Answer:   Routine Release [[id number]]       Meds Ordered During Visit:  New Medications Ordered This Visit   Medications    amLODIPine (NORVASC) 5 MG tablet     Sig: Take 1 tablet by mouth Daily.     Dispense:  90 tablet     Refill:  1    omeprazole (priLOSEC) 20 MG capsule     Sig: Take 1 capsule by mouth Daily.     Dispense:  90 capsule     Refill:  1     I reviewed records from general surgery and rad oncology today. I encouraged her to follow up with her specialists as as planned.  I updated medicine refills today as requested.  We will update labs today as above as well.  Prevnar 20 was updated today.    Return in about 6 months (around 10/11/2024), or if symptoms worsen or fail to improve, for Recheck. Please obtain colonoscopy report from Dr. Ervin, [facility].          Referring Provider (if known): No ref. provider found      This document has been electronically signed by Fany Wilson DO  April 11, 2024 16:00 EDT    Fany Wilson DO, FACOI  [address] [place] [address]  [phone] (office)    Part of this note may be an electronic transcription/translation of spoken language to printed text using the Dragon Dictation System.

## 2024-04-12 ENCOUNTER — PATIENT ROUNDING (BHMG ONLY) (OUTPATIENT)
Dept: FAMILY MEDICINE CLINIC | Facility: CLINIC | Age: 69
End: 2024-04-12
Payer: MEDICARE

## 2024-04-12 LAB
25(OH)D3 SERPL-MCNC: 88.8 NG/ML (ref 30–100)
ALBUMIN SERPL-MCNC: 4.5 G/DL (ref 3.5–5.2)
ALBUMIN/GLOB SERPL: 2.4 G/DL
ALP SERPL-CCNC: 55 U/L (ref 39–117)
ALT SERPL W P-5'-P-CCNC: 11 U/L (ref 1–33)
ANION GAP SERPL CALCULATED.3IONS-SCNC: 10.4 MMOL/L (ref 5–15)
AST SERPL-CCNC: 15 U/L (ref 1–32)
BILIRUB SERPL-MCNC: 0.3 MG/DL (ref 0–1.2)
BUN SERPL-MCNC: 16 MG/DL (ref 8–23)
BUN/CREAT SERPL: 12.7 (ref 7–25)
CALCIUM SPEC-SCNC: 9.3 MG/DL (ref 8.6–10.5)
CHLORIDE SERPL-SCNC: 107 MMOL/L (ref 98–107)
CHOLEST SERPL-MCNC: 190 MG/DL (ref 0–200)
CO2 SERPL-SCNC: 25.6 MMOL/L (ref 22–29)
CREAT SERPL-MCNC: 1.26 MG/DL (ref 0.57–1)
DEPRECATED RDW RBC AUTO: 43.8 FL (ref 37–54)
EGFRCR SERPLBLD CKD-EPI 2021: 46.3 ML/MIN/1.73
ERYTHROCYTE [DISTWIDTH] IN BLOOD BY AUTOMATED COUNT: 13 % (ref 12.3–15.4)
GLOBULIN UR ELPH-MCNC: 1.9 GM/DL
GLUCOSE SERPL-MCNC: 98 MG/DL (ref 65–99)
HCT VFR BLD AUTO: 39.2 % (ref 34–46.6)
HCV AB SER DONR QL: NORMAL
HDLC SERPL-MCNC: 54 MG/DL (ref 40–60)
HGB BLD-MCNC: 12.9 G/DL (ref 12–15.9)
LDLC SERPL CALC-MCNC: 117 MG/DL (ref 0–100)
LDLC/HDLC SERPL: 2.14 {RATIO}
MCH RBC QN AUTO: 30 PG (ref 26.6–33)
MCHC RBC AUTO-ENTMCNC: 32.9 G/DL (ref 31.5–35.7)
MCV RBC AUTO: 91.2 FL (ref 79–97)
PLATELET # BLD AUTO: 222 10*3/MM3 (ref 140–450)
PMV BLD AUTO: 11.1 FL (ref 6–12)
POTASSIUM SERPL-SCNC: 3.7 MMOL/L (ref 3.5–5.2)
PROT SERPL-MCNC: 6.4 G/DL (ref 6–8.5)
RBC # BLD AUTO: 4.3 10*6/MM3 (ref 3.77–5.28)
SODIUM SERPL-SCNC: 143 MMOL/L (ref 136–145)
TRIGL SERPL-MCNC: 103 MG/DL (ref 0–150)
TSH SERPL DL<=0.05 MIU/L-ACNC: 0.96 UIU/ML (ref 0.27–4.2)
VLDLC SERPL-MCNC: 19 MG/DL (ref 5–40)
WBC NRBC COR # BLD AUTO: 6.54 10*3/MM3 (ref 3.4–10.8)

## 2024-05-01 ENCOUNTER — HOSPITAL ENCOUNTER (OUTPATIENT)
Dept: MAMMOGRAPHY | Facility: HOSPITAL | Age: 69
Discharge: HOME | End: 2024-05-01
Payer: MEDICARE

## 2024-05-01 ENCOUNTER — HOSPITAL ENCOUNTER (OUTPATIENT)
Dept: BONE DENSITY | Facility: HOSPITAL | Age: 69
Discharge: HOME | End: 2024-05-01
Payer: MEDICARE

## 2024-05-01 DIAGNOSIS — D05.12 BREAST NEOPLASM, TIS (DCIS), LEFT: ICD-10-CM

## 2024-05-01 DIAGNOSIS — Z78.0 POST-MENOPAUSE: ICD-10-CM

## 2024-05-01 PROCEDURE — 77080 DXA BONE DENSITY AXIAL: CPT

## 2024-05-01 PROCEDURE — G0279 TOMOSYNTHESIS, MAMMO: HCPCS | Performed by: RADIOLOGY

## 2024-05-01 PROCEDURE — G0279 TOMOSYNTHESIS, MAMMO: HCPCS

## 2024-05-01 PROCEDURE — 77066 DX MAMMO INCL CAD BI: CPT | Performed by: RADIOLOGY

## 2024-05-01 PROCEDURE — 77066 DX MAMMO INCL CAD BI: CPT

## 2024-05-01 PROCEDURE — 77080 DXA BONE DENSITY AXIAL: CPT | Performed by: RADIOLOGY

## 2024-05-16 ENCOUNTER — OFFICE VISIT (OUTPATIENT)
Dept: SURGERY | Facility: CLINIC | Age: 69
End: 2024-05-16
Payer: MEDICARE

## 2024-05-16 VITALS
HEART RATE: 66 BPM | BODY MASS INDEX: 22.56 KG/M2 | HEIGHT: 65 IN | WEIGHT: 135.4 LBS | DIASTOLIC BLOOD PRESSURE: 70 MMHG | SYSTOLIC BLOOD PRESSURE: 118 MMHG

## 2024-05-16 DIAGNOSIS — D05.12 BREAST NEOPLASM, TIS (DCIS), LEFT: Primary | ICD-10-CM

## 2024-05-16 DIAGNOSIS — M85.88 OSTEOPENIA OF LUMBAR SPINE: ICD-10-CM

## 2024-05-16 PROCEDURE — 99213 OFFICE O/P EST LOW 20 MIN: CPT | Performed by: SURGERY

## 2024-05-16 PROCEDURE — 3078F DIAST BP <80 MM HG: CPT | Performed by: SURGERY

## 2024-05-16 PROCEDURE — 1159F MED LIST DOCD IN RCRD: CPT | Performed by: SURGERY

## 2024-05-16 PROCEDURE — 1160F RVW MEDS BY RX/DR IN RCRD: CPT | Performed by: SURGERY

## 2024-05-16 PROCEDURE — 3074F SYST BP LT 130 MM HG: CPT | Performed by: SURGERY

## 2024-05-16 NOTE — PROGRESS NOTES
Subjective   Dodie Kelly is a 68 y.o. female here today for mammogram and DEXA scan.    History of Present Illness   was seen in the office today for breast cancer follow-up.  She is status post a left lumpectomy for DCIS on 10/12/2021.  Patient completed a course of radiation.    She declined hormonal therapy.  Ms. Kelly presents to the office today having had a bilateral mammogram with tomosynthesis on 5/1/24 which demonstrated no changes over prior.    She denies any palpable abnormalities in the breast or the axilla.  She denies any arm edema.  She denies any changes in her health.   Bone density was performed on 5/1/24 which demonstrated osteopenia with a T score of -2.0.  Patient states she is taking calcium and vitamin D  Allergies   Allergen Reactions    Bactrim [Sulfamethoxazole-Trimethoprim] Other (See Comments)     Passed out    Lisinopril Cough    Losartan Rash       Current Outpatient Medications   Medication Sig Dispense Refill    amLODIPine (NORVASC) 5 MG tablet Take 1 tablet by mouth Daily. 90 tablet 1    calcium carbonate (OS-[place]) 600 MG tablet Take 1 tablet by mouth Daily. 2 a day      Omega-3 Fatty Acids (fish oil) 1000 MG capsule capsule Take  by mouth Daily With Breakfast.      omeprazole (priLOSEC) 20 MG capsule Take 1 capsule by mouth Daily. 90 capsule 1    Vitamin D, Cholecalciferol, (CHOLECALCIFEROL) 10 MCG (400 UNIT) tablet Take 1 tablet by mouth Daily.       No current facility-administered medications for this visit.     Past Medical History:   Diagnosis Date    Breast cancer 2021    left    Cancer     breast    GERD (gastroesophageal reflux disease)     Hypertension      Past Surgical History:   Procedure Laterality Date    BREAST BIOPSY Left 10/12/2021    Procedure: BREAST BIOPSY WITH NEEDLE LOCALIZATION;  Surgeon: Estefania Dias MD;  Location: [facility];  Service: General;  Laterality: Left;    BREAST LUMPECTOMY Left 10/12/2021    [place]    COLONOSCOPY         Pertinent  "Review of Systems    Objective   /70 (BP Location: Left arm)   Pulse 66   Ht 165.1 cm (65\")   Wt 61.4 kg (135 lb 6.4 oz)   BMI 22.53 kg/m²    Physical Exam  Well-developed well-nourished female no acute distress.  Neck:  No supraclavicular adenopathy  Lungs:  Respiratory effort normal. Auscultation: Clear, without wheezes, rhonchi, rales  Heart:  Regular rate and rhythm, without murmur, gallop, rub.  No pedal edema  Breasts: On visual inspection the breasts are symmetrical.  Examination of the right breast demonstrates no discrete mass, skin change, or axillary adenopathy.  Examination of the left breast demonstrates a healed surgical scar in the upper outer quadrant.  The tissue is dense.  There is no palpable mass or adenopathy..   Upper extremity: Without edema  I have reviewed the copied text and it is accurate as of 5/16/2024   Procedures     Results/Data:  Imaging: Mammogram reports and images from 5/1/2024 and bone density report from 5/1/2024.  I agree with the assessment      Assessment & Plan   DCIS of the left breast, ER positive, [place] positive.  Status post lumpectomy with radiation  Patient declined hormonal therapy.  Osteopenia, slightly worse over prior    Plan: Bilateral mammogram in 1 year with return to the office following         Discussion/Summary:    Time spent:     BMI is within normal parameters. No other follow-up for BMI required.         Future Appointments   Date Time Provider Department Center   10/17/2024  9:00 AM Fany Wilson DO [facility]       Please note that portions of this note were completed with a voice recognition program.  "

## 2024-10-17 ENCOUNTER — OFFICE VISIT (OUTPATIENT)
Dept: FAMILY MEDICINE CLINIC | Facility: CLINIC | Age: 69
End: 2024-10-17
Payer: MEDICARE

## 2024-10-17 VITALS
TEMPERATURE: 98 F | HEART RATE: 59 BPM | OXYGEN SATURATION: 100 % | HEIGHT: 65 IN | DIASTOLIC BLOOD PRESSURE: 68 MMHG | SYSTOLIC BLOOD PRESSURE: 110 MMHG | BODY MASS INDEX: 22.49 KG/M2 | WEIGHT: 135 LBS

## 2024-10-17 DIAGNOSIS — K21.9 GASTROESOPHAGEAL REFLUX DISEASE WITHOUT ESOPHAGITIS: ICD-10-CM

## 2024-10-17 DIAGNOSIS — E55.9 VITAMIN D DEFICIENCY: ICD-10-CM

## 2024-10-17 DIAGNOSIS — M85.851 OSTEOPENIA OF NECK OF RIGHT FEMUR: ICD-10-CM

## 2024-10-17 DIAGNOSIS — I10 PRIMARY HYPERTENSION: ICD-10-CM

## 2024-10-17 DIAGNOSIS — Z23 NEED FOR IMMUNIZATION AGAINST INFLUENZA: Primary | ICD-10-CM

## 2024-10-17 LAB
25(OH)D3 SERPL-MCNC: 76.1 NG/ML (ref 30–100)
ALBUMIN SERPL-MCNC: 4.4 G/DL (ref 3.5–5.2)
ALBUMIN/GLOB SERPL: 2.2 G/DL
ALP SERPL-CCNC: 63 U/L (ref 39–117)
ALT SERPL W P-5'-P-CCNC: 9 U/L (ref 1–33)
ANION GAP SERPL CALCULATED.3IONS-SCNC: 11.1 MMOL/L (ref 5–15)
AST SERPL-CCNC: 18 U/L (ref 1–32)
BILIRUB SERPL-MCNC: 0.5 MG/DL (ref 0–1.2)
BUN SERPL-MCNC: 17 MG/DL (ref 8–23)
BUN/CREAT SERPL: 18.3 (ref 7–25)
CALCIUM SPEC-SCNC: 9.5 MG/DL (ref 8.6–10.5)
CHLORIDE SERPL-SCNC: 105 MMOL/L (ref 98–107)
CHOLEST SERPL-MCNC: 201 MG/DL (ref 0–200)
CO2 SERPL-SCNC: 24.9 MMOL/L (ref 22–29)
CREAT SERPL-MCNC: 0.93 MG/DL (ref 0.57–1)
DEPRECATED RDW RBC AUTO: 41.8 FL (ref 37–54)
EGFRCR SERPLBLD CKD-EPI 2021: 66.7 ML/MIN/1.73
ERYTHROCYTE [DISTWIDTH] IN BLOOD BY AUTOMATED COUNT: 12.9 % (ref 12.3–15.4)
GLOBULIN UR ELPH-MCNC: 2 GM/DL
GLUCOSE SERPL-MCNC: 74 MG/DL (ref 65–99)
HCT VFR BLD AUTO: 37.8 % (ref 34–46.6)
HDLC SERPL-MCNC: 58 MG/DL (ref 40–60)
HGB BLD-MCNC: 13.2 G/DL (ref 12–15.9)
LDLC SERPL CALC-MCNC: 129 MG/DL (ref 0–100)
LDLC/HDLC SERPL: 2.2 {RATIO}
MCH RBC QN AUTO: 31.7 PG (ref 26.6–33)
MCHC RBC AUTO-ENTMCNC: 34.9 G/DL (ref 31.5–35.7)
MCV RBC AUTO: 90.6 FL (ref 79–97)
PLATELET # BLD AUTO: 235 10*3/MM3 (ref 140–450)
PMV BLD AUTO: 11 FL (ref 6–12)
POTASSIUM SERPL-SCNC: 4 MMOL/L (ref 3.5–5.2)
PROT SERPL-MCNC: 6.4 G/DL (ref 6–8.5)
RBC # BLD AUTO: 4.17 10*6/MM3 (ref 3.77–5.28)
SODIUM SERPL-SCNC: 141 MMOL/L (ref 136–145)
TRIGL SERPL-MCNC: 78 MG/DL (ref 0–150)
TSH SERPL DL<=0.05 MIU/L-ACNC: 1.07 UIU/ML (ref 0.27–4.2)
VLDLC SERPL-MCNC: 14 MG/DL (ref 5–40)
WBC NRBC COR # BLD AUTO: 5.98 10*3/MM3 (ref 3.4–10.8)

## 2024-10-17 PROCEDURE — 82306 VITAMIN D 25 HYDROXY: CPT | Performed by: INTERNAL MEDICINE

## 2024-10-17 PROCEDURE — 80061 LIPID PANEL: CPT | Performed by: INTERNAL MEDICINE

## 2024-10-17 PROCEDURE — 84443 ASSAY THYROID STIM HORMONE: CPT | Performed by: INTERNAL MEDICINE

## 2024-10-17 PROCEDURE — 85027 COMPLETE CBC AUTOMATED: CPT | Performed by: INTERNAL MEDICINE

## 2024-10-17 PROCEDURE — 80053 COMPREHEN METABOLIC PANEL: CPT | Performed by: INTERNAL MEDICINE

## 2024-10-17 RX ORDER — AMLODIPINE BESYLATE 5 MG/1
5 TABLET ORAL DAILY
Qty: 90 TABLET | Refills: 1 | Status: SHIPPED | OUTPATIENT
Start: 2024-10-17

## 2024-10-17 NOTE — PROGRESS NOTES
Subjective   The ABCs of the Annual Wellness Visit  Medicare Wellness Visit      Dodie Kelly is a 68 y.o. patient who presents for a Medicare Wellness Visit.    The following portions of the patient's history were reviewed and   updated as appropriate: allergies, current medications, past family history, past medical history, past social history, past surgical history, and problem list.    Compared to one year ago, the patient's physical   health is the same.  Compared to one year ago, the patient's mental   health is better.    Recent Hospitalizations:  She was not admitted to the hospital during the last year.     Current Medical Providers:  Patient Care Team:  Fany Wilson DO as PCP - General (Family Medicine)  Lucy Helms APRN as Nurse Practitioner (Radiation Oncology)  Estefania Dias MD as Surgeon (General Surgery)  Nimesh Mitchell MD as Consulting Physician (Radiation Oncology)    Outpatient Medications Prior to Visit   Medication Sig Dispense Refill    calcium carbonate (OS-ALBANIA) 600 MG tablet Take 1 tablet by mouth Daily. 2 a day      Vitamin D, Cholecalciferol, (CHOLECALCIFEROL) 10 MCG (400 UNIT) tablet Take 1 tablet by mouth Daily.      amLODIPine (NORVASC) 5 MG tablet Take 1 tablet by mouth Daily. 90 tablet 1    omeprazole (priLOSEC) 20 MG capsule Take 1 capsule by mouth Daily. 90 capsule 1    Omega-3 Fatty Acids (fish oil) 1000 MG capsule capsule Take  by mouth Daily With Breakfast. (Patient not taking: Reported on 10/17/2024)       No facility-administered medications prior to visit.     No opioid medication identified on active medication list. I have reviewed chart for other potential  high risk medication/s and harmful drug interactions in the elderly.      Aspirin is not on active medication list.  Aspirin use is not indicated based on review of current medical condition/s. Risk of harm outweighs potential benefits.      Patient Active Problem List   Diagnosis    Breast  "neoplasm, Tis (DCIS), left    Ductal carcinoma in situ (DCIS) of left breast    Osteopenia of neck of right femur    Primary hypertension    Gastroesophageal reflux disease without esophagitis    Vitamin D deficiency     Advance Care Planning Advance Directive is not on file.  ACP discussion was held with the patient during this visit. Patient does not have an advance directive, declines further assistance.            Objective   Vitals:    10/17/24 0858   BP: 110/68   BP Location: Right arm   Patient Position: Sitting   Cuff Size: Adult   Pulse: 59   Temp: 98 °F (36.7 °C)   TempSrc: Oral   SpO2: 100%   Weight: 61.2 kg (135 lb)   Height: 165.1 cm (65\")   PainSc: 0-No pain       Estimated body mass index is 22.47 kg/m² as calculated from the following:    Height as of this encounter: 165.1 cm (65\").    Weight as of this encounter: 61.2 kg (135 lb).    BMI is within normal parameters. No other follow-up for BMI required.       Does the patient have evidence of cognitive impairment? No                                                                                                Health  Risk Assessment    Smoking Status:  Social History     Tobacco Use   Smoking Status Never   Smokeless Tobacco Never     Alcohol Consumption:  Social History     Substance and Sexual Activity   Alcohol Use Never       Fall Risk Screen  STEADI Fall Risk Assessment was completed, and patient is at MODERATE risk for falls. Assessment completed on:10/17/2024    Depression Screening:      10/17/2024     8:57 AM   PHQ-2/PHQ-9 Depression Screening   Little interest or pleasure in doing things Not at all   Feeling down, depressed, or hopeless Not at all     Health Habits and Functional and Cognitive Screening:      10/17/2024     8:55 AM   Functional & Cognitive Status   Do you have difficulty preparing food and eating? No   Do you have difficulty bathing yourself, getting dressed or grooming yourself? No   Do you have difficulty using the " toilet? No   Do you have difficulty moving around from place to place? No   Do you have trouble with steps or getting out of a bed or a chair? No   Current Diet Limited Junk Food   Dental Exam Other        Dental Exam Comment Dentures   Eye Exam Up to date   Exercise (times per week) 7 times per week   Current Exercises Include Stationary Bicycling/Spin Class   Do you need help using the phone?  No   Are you deaf or do you have serious difficulty hearing?  Yes   Do you need help to go to places out of walking distance? No   Do you need help shopping? No   Do you need help preparing meals?  No   Do you need help with housework?  No   Do you need help with laundry? No   Do you need help taking your medications? No   Do you need help managing money? No   Do you ever drive or ride in a car without wearing a seat belt? No   Have you felt unusual stress, anger or loneliness in the last month? Yes   Who do you live with? Spouse   If you need help, do you have trouble finding someone available to you? No   Have you been bothered in the last four weeks by sexual problems? No   Do you have difficulty concentrating, remembering or making decisions? No           Age-appropriate Screening Schedule:  Refer to the list below for future screening recommendations based on patient's age, sex and/or medical conditions. Orders for these recommended tests are listed in the plan section. The patient has been provided with a written plan.    Health Maintenance List  Health Maintenance   Topic Date Due    TDAP/TD VACCINES (1 - Tdap) Never done    ZOSTER VACCINE (1 of 2) Never done    COVID-19 Vaccine (4 - 2023-24 season) 09/01/2024    MAMMOGRAM  05/01/2025    COLORECTAL CANCER SCREENING  07/24/2025    ANNUAL WELLNESS VISIT  10/17/2025    DXA SCAN  05/01/2026    HEPATITIS C SCREENING  Completed    INFLUENZA VACCINE  Completed    Pneumococcal Vaccine 65+  Completed                                                                                "                                                                 CMS Preventative Services Quick Reference  Risk Factors Identified During Encounter  Immunizations Discussed/Encouraged: Influenza-updated today. She declines shingrix at this time.     Age appropriate screenings were discussed with patient today. She is utd on mammogram, colonoscopy and dexa.  We will update metabolic screenings today.    The above risks/problems have been discussed with the patient.  Pertinent information has been shared with the patient in the After Visit Summary.  An After Visit Summary and PPPS were made available to the patient.    Follow Up:   Next Medicare Wellness visit to be scheduled in 1 year.         Additional E&M Note during same encounter follows:  Patient has additional, significant, and separately identifiable condition(s)/problem(s) that require work above and beyond the Medicare Wellness Visit     Chief Complaint  Medicare Wellness-subsequent (Doing good today.), Back Pain (Does have some mild lower back pain at times. ), and Hypertension (Blood pressure feels like it is doing good. Not had any symptoms of it being low or high. )    Subjective   HPI  Dodie is also being seen today for additional medical problem/s including hypertension, osteopenia, gastroesophageal reflux disease, vitamin D deficiency, and history of breast cancer.  She reports doing well since last visit.  She denies chest pain or shortness of air.  Her weight has been stable.  She is feeling well.  She denies abnormal bleeding.    Review of Systems   Respiratory:  Negative for shortness of breath.    Cardiovascular:  Negative for chest pain.   Gastrointestinal:  Negative for blood in stool.   Genitourinary:  Negative for hematuria.              Objective   Vital Signs:  /68 (BP Location: Right arm, Patient Position: Sitting, Cuff Size: Adult)   Pulse 59   Temp 98 °F (36.7 °C) (Oral)   Ht 165.1 cm (65\")   Wt 61.2 kg (135 lb)   SpO2 " 100%   BMI 22.47 kg/m²   Physical Exam  Vitals reviewed.   Constitutional:       General: She is not in acute distress.  HENT:      Head: Normocephalic and atraumatic.   Eyes:      General: No scleral icterus.     Extraocular Movements: Extraocular movements intact.      Conjunctiva/sclera: Conjunctivae normal.      Pupils: Pupils are equal, round, and reactive to light.   Cardiovascular:      Rate and Rhythm: Regular rhythm. Bradycardia present.   Pulmonary:      Effort: Pulmonary effort is normal. No respiratory distress.      Breath sounds: Normal breath sounds. No wheezing or rhonchi.   Musculoskeletal:         General: No swelling.      Cervical back: Neck supple. No tenderness.   Lymphadenopathy:      Cervical: No cervical adenopathy.   Skin:     General: Skin is warm and dry.      Coloration: Skin is not jaundiced.   Neurological:      Mental Status: She is alert.   Psychiatric:         Mood and Affect: Mood normal.         Behavior: Behavior normal.         Thought Content: Thought content normal.         Judgment: Judgment normal.                 Assessment and Plan               Need for immunization against influenza    Primary hypertension    Vitamin D deficiency    Osteopenia of neck of right femur    Gastroesophageal reflux disease without esophagitis      Orders Placed This Encounter   Procedures    Fluzone High-Dose 65+yrs (6615-5691)    Comprehensive Metabolic Panel     Order Specific Question:   Release to patient     Answer:   Routine Release [[id number]]    CBC (No Diff)     Order Specific Question:   Release to patient     Answer:   Routine Release [[id number]]    Lipid Panel     Order Specific Question:   Release to patient     Answer:   Routine Release [[id number]]    TSH     Order Specific Question:   Release to patient     Answer:   Routine Release [[id number]]    Vitamin D,25-Hydroxy     Order Specific Question:   Release to patient     Answer:   Routine Release [[id number]]     New  Medications Ordered This Visit   Medications    amLODIPine (NORVASC) 5 MG tablet     Sig: Take 1 tablet by mouth Daily.     Dispense:  90 tablet     Refill:  1    omeprazole (priLOSEC) 20 MG capsule     Sig: Take 1 capsule by mouth Daily.     Dispense:  90 capsule     Refill:  1      Clinically, Dodie is doing well.  We will continue current medicine regimen.  Updated refills as requested.  We will also update labs today as above.  I encouraged her to hydrate well.  Flu shot was updated today.  I reviewed Dr. Dias's note and encourage patient to continue specialty follow-up as planned.    Follow Up   Return in about 6 months (around 4/17/2025), or if symptoms worsen or fail to improve, for Recheck.  Patient was given instructions and counseling regarding her condition or for health maintenance advice. Please see specific information pulled into the AVS if appropriate.      This document has been electronically signed by Fany Wilson DO  October 17, 2024 10:12 EDT

## 2024-10-17 NOTE — PROGRESS NOTES
Venipuncture Blood Specimen Collection  Venipuncture performed in right antecubital by Heather Symes, MA with good hemostasis. Patient tolerated the procedure well without complications.   10/17/24   Heather Symes, MA    Immunization  Immunization performed in left deltoid by Heather Symes, MA. Patient tolerated the procedure well without complications.  10/17/24   Heather Symes, MA

## 2025-04-17 ENCOUNTER — OFFICE VISIT (OUTPATIENT)
Dept: FAMILY MEDICINE CLINIC | Facility: CLINIC | Age: 70
End: 2025-04-17
Payer: MEDICARE

## 2025-04-17 ENCOUNTER — PREP FOR SURGERY (OUTPATIENT)
Dept: OTHER | Facility: HOSPITAL | Age: 70
End: 2025-04-17
Payer: MEDICARE

## 2025-04-17 VITALS
HEIGHT: 65 IN | HEART RATE: 56 BPM | DIASTOLIC BLOOD PRESSURE: 72 MMHG | OXYGEN SATURATION: 99 % | BODY MASS INDEX: 23.36 KG/M2 | SYSTOLIC BLOOD PRESSURE: 110 MMHG | WEIGHT: 140.2 LBS | TEMPERATURE: 98 F

## 2025-04-17 DIAGNOSIS — Z12.11 ENCOUNTER FOR SCREENING FOR MALIGNANT NEOPLASM OF COLON: Primary | ICD-10-CM

## 2025-04-17 DIAGNOSIS — K63.5 COLORECTAL POLYP DETECTED ON COLONOSCOPY: ICD-10-CM

## 2025-04-17 DIAGNOSIS — E78.2 MIXED HYPERLIPIDEMIA: ICD-10-CM

## 2025-04-17 DIAGNOSIS — I10 PRIMARY HYPERTENSION: Primary | ICD-10-CM

## 2025-04-17 DIAGNOSIS — K21.9 GASTROESOPHAGEAL REFLUX DISEASE WITHOUT ESOPHAGITIS: ICD-10-CM

## 2025-04-17 DIAGNOSIS — Z12.11 COLON CANCER SCREENING: ICD-10-CM

## 2025-04-17 LAB
ALBUMIN SERPL-MCNC: 4.9 G/DL (ref 3.5–5.2)
ALBUMIN/GLOB SERPL: 2 G/DL
ALP SERPL-CCNC: 78 U/L (ref 39–117)
ALT SERPL W P-5'-P-CCNC: 13 U/L (ref 1–33)
ANION GAP SERPL CALCULATED.3IONS-SCNC: 12.8 MMOL/L (ref 5–15)
AST SERPL-CCNC: 19 U/L (ref 1–32)
BILIRUB SERPL-MCNC: 0.6 MG/DL (ref 0–1.2)
BUN SERPL-MCNC: 16 MG/DL (ref 8–23)
BUN/CREAT SERPL: 18.2 (ref 7–25)
CALCIUM SPEC-SCNC: 9.7 MG/DL (ref 8.6–10.5)
CHLORIDE SERPL-SCNC: 104 MMOL/L (ref 98–107)
CHOLEST SERPL-MCNC: 247 MG/DL (ref 0–200)
CO2 SERPL-SCNC: 26.2 MMOL/L (ref 22–29)
CREAT SERPL-MCNC: 0.88 MG/DL (ref 0.57–1)
EGFRCR SERPLBLD CKD-EPI 2021: 70.8 ML/MIN/1.73
GLOBULIN UR ELPH-MCNC: 2.4 GM/DL
GLUCOSE SERPL-MCNC: 91 MG/DL (ref 65–99)
HDLC SERPL-MCNC: 74 MG/DL (ref 40–60)
LDLC SERPL CALC-MCNC: 159 MG/DL (ref 0–100)
LDLC/HDLC SERPL: 2.12 {RATIO}
POTASSIUM SERPL-SCNC: 3.9 MMOL/L (ref 3.5–5.2)
PROT SERPL-MCNC: 7.3 G/DL (ref 6–8.5)
SODIUM SERPL-SCNC: 143 MMOL/L (ref 136–145)
TRIGL SERPL-MCNC: 82 MG/DL (ref 0–150)
VLDLC SERPL-MCNC: 14 MG/DL (ref 5–40)

## 2025-04-17 PROCEDURE — 80061 LIPID PANEL: CPT | Performed by: INTERNAL MEDICINE

## 2025-04-17 PROCEDURE — 80053 COMPREHEN METABOLIC PANEL: CPT | Performed by: INTERNAL MEDICINE

## 2025-04-17 RX ORDER — AMLODIPINE BESYLATE 5 MG/1
5 TABLET ORAL DAILY
Qty: 90 TABLET | Refills: 1 | Status: SHIPPED | OUTPATIENT
Start: 2025-04-17

## 2025-04-17 RX ORDER — OMEPRAZOLE 20 MG/1
20 CAPSULE, DELAYED RELEASE ORAL DAILY
Qty: 90 CAPSULE | Refills: 1 | Status: SHIPPED | OUTPATIENT
Start: 2025-04-17

## 2025-04-17 RX ORDER — BISACODYL 5 MG
5 TABLET, DELAYED RELEASE (ENTERIC COATED) ORAL DAILY PRN
Qty: 4 TABLET | Refills: 0 | Status: SHIPPED | OUTPATIENT
Start: 2025-04-17

## 2025-04-17 RX ORDER — POLYETHYLENE GLYCOL 3350 17 G/17G
17 POWDER, FOR SOLUTION ORAL DAILY
Qty: 510 G | Refills: 0 | Status: SHIPPED | OUTPATIENT
Start: 2025-04-17

## 2025-04-17 NOTE — PROGRESS NOTES
Patient Name: Dodie Kelly Today's Date: 2025   Patient MRN / CSN: [id number] / [id number] Date of Encounter: 2025   Patient Age / : 69 y.o. / 1955 Encounter Provider: Fany Wilson DO   Referring Physician: No ref. provider found          Dodie is a 69 y.o. female who is being seen today for Follow-up (She is doing good today.  No issues or concerns.), Hypertension (She is not checking at home.), and GI Problem      Hypertension  This is a chronic problem. The current episode started more than 1 year ago. The problem is unchanged. The problem is controlled. Pertinent negatives include no anxiety, blurred vision, chest pain, headaches, malaise/fatigue, orthopnea, palpitations, peripheral edema or shortness of breath. There are no associated agents to hypertension. Risk factors for coronary artery disease include dyslipidemia and family history. Current antihypertension treatment includes calcium channel blockers. The current treatment provides significant improvement. There are no compliance problems.    GI Problem  Primary symptoms comment: Heartburn, acid reflux well-controlled with omeprazole.       Allergies include:Bactrim [sulfamethoxazole-trimethoprim], Lisinopril, and Losartan  Current Outpatient Medications   Medication Sig Dispense Refill    amLODIPine (NORVASC) 5 MG tablet Take 1 tablet by mouth Daily. 90 tablet 1    calcium carbonate (OS-ALB[place]IA) 600 MG tablet Take 1 tablet by mouth Daily. 2 a day      omeprazole (priLOSEC) 20 MG capsule Take 1 capsule by mouth Daily. 90 capsule 1    Vitamin D, Cholecalciferol, (CHOLECALCIFEROL) 10 MCG (400 UNIT) tablet Take 1 tablet by mouth Daily.       No current facility-administered medications for this visit.     Past Medical History:   Diagnosis Date    Breast cancer     left    Cancer     breast    GERD (gastroesophageal reflux disease)     Hypertension      Family History   Problem Relation Age of Onset    Hypertension Mother      "Heart disease Father     Hypertension Sister     Hypertension Daughter     Cancer Maternal Grandmother         colon    Cancer Brother     Breast cancer Neg Hx      Past Surgical History:   Procedure Laterality Date    BREAST BIOPSY Left 10/12/2021    Procedure: BREAST BIOPSY WITH NEEDLE LOCALIZATION;  Surgeon: Estefania Dias MD;  Location: [facility];  Service: General;  Laterality: Left;    BREAST LUMPECTOMY Left 10/12/2021    [place]    COLONOSCOPY       Social History     Substance and Sexual Activity   Alcohol Use Never     Social History     Tobacco Use   Smoking Status Never   Smokeless Tobacco Never     Social History     Substance and Sexual Activity   Drug Use Never     Review of Systems   Constitutional:  Negative for malaise/fatigue.   Eyes:  Negative for blurred vision.   Respiratory:  Negative for shortness of breath.    Cardiovascular:  Negative for chest pain, palpitations and orthopnea.   Gastrointestinal:  Negative for blood in stool.   Genitourinary:  Negative for hematuria.   Neurological:  Negative for headaches.        Depression Assessment Review:  PHQ-9 Total Score:    Vital Signs & Measurements Taken This Encounter  /72 (BP Location: Right arm, Patient Position: Sitting, Cuff Size: Adult)   Pulse 56   Temp 98 °F (36.7 °C) (Oral)   Ht 165.1 cm (65\")   Wt 63.6 kg (140 lb 3.2 oz)   SpO2 99%   BMI 23.33 kg/m²    SpO2 Percentage    04/17/25 0851   SpO2: 99%        BMI is within normal parameters. No other follow-up for BMI required.      Physical Exam  Vitals reviewed.   Constitutional:       General: She is not in acute distress.  HENT:      Head: Normocephalic and atraumatic.   Eyes:      General: No scleral icterus.     Extraocular Movements: Extraocular movements intact.      Conjunctiva/sclera: Conjunctivae normal.      Pupils: Pupils are equal, round, and reactive to light.   Cardiovascular:      Rate and Rhythm: Regular rhythm. Bradycardia present.   Pulmonary:      Effort: " Pulmonary effort is normal. No respiratory distress.      Breath sounds: Normal breath sounds. No wheezing or rhonchi.   Musculoskeletal:         General: No swelling.      Cervical back: Neck supple. No tenderness.   Lymphadenopathy:      Cervical: No cervical adenopathy.   Skin:     General: Skin is warm and dry.      Coloration: Skin is not jaundiced.   Neurological:      Mental Status: She is alert.   Psychiatric:         Mood and Affect: Mood normal.         Behavior: Behavior normal.         Thought Content: Thought content normal.         Judgment: Judgment normal.              Assessment & Plan  Patient Active Problem List   Diagnosis    Breast neoplasm, Tis (DCIS), left    Ductal carcinoma in situ (DCIS) of left breast    Osteopenia of neck of right femur    Primary hypertension    Gastroesophageal reflux disease without esophagitis    Vitamin D deficiency       ICD-10-CM ICD-9-CM   1. Primary hypertension  I10 401.9   2. Gastroesophageal reflux disease without esophagitis  K21.9 530.81   3. Mixed hyperlipidemia  E78.2 272.2   4. Colon cancer screening  Z12.11 V76.51     Diagnoses and all orders for this visit:    1. Primary hypertension (Primary)  -     amLODIPine (NORVASC) 5 MG tablet; Take 1 tablet by mouth Daily.  Dispense: 90 tablet; Refill: 1    2. Gastroesophageal reflux disease without esophagitis  -     omeprazole (priLOSEC) 20 MG capsule; Take 1 capsule by mouth Daily.  Dispense: 90 capsule; Refill: 1    3. Mixed hyperlipidemia  -     Comprehensive Metabolic Panel  -     Lipid Panel    4. Colon cancer screening  -     Ambulatory Referral to Gastroenterology         Meds Ordered During Visit:  New Medications Ordered This Visit   Medications    omeprazole (priLOSEC) 20 MG capsule     Sig: Take 1 capsule by mouth Daily.     Dispense:  90 capsule     Refill:  1    amLODIPine (NORVASC) 5 MG tablet     Sig: Take 1 tablet by mouth Daily.     Dispense:  90 tablet     Refill:  1       ClinicallyDodie  is doing well.  I recommend continuing current regimen.  We will update labs today.  Updated refills for her today as well.  Will refer for colonoscopy.  She prefers to see Dr. Nguyễn instead of Dr. Ervin.  I encouraged her to update mammogram as planned.  Encouraged healthy diet choices.  We will plan on a 6-month follow-up, or certainly sooner if needed.    Return in about 6 months (around 10/17/2025), or if symptoms worsen or fail to improve, for Medicare Wellness, Recheck.          Referring Provider (if known): No ref. provider found      This document has been electronically signed by Fany Wilson DO  April 17, 2025 10:18 EDT    Fany Wilson DO, FACOI  [address] [place] [address] [phone] (office)    Part of this note may be an electronic transcription/translation of spoken language to printed text using the Dragon Dictation System.

## 2025-04-17 NOTE — PROGRESS NOTES
Venipuncture Blood Specimen Collection  Venipuncture performed in right antecubital by Carlos Bai with good hemostasis. Patient tolerated the procedure well without complications.   04/17/25   Carlos Bai

## 2025-05-01 ENCOUNTER — TELEPHONE (OUTPATIENT)
Dept: FAMILY MEDICINE CLINIC | Facility: CLINIC | Age: 70
End: 2025-05-01
Payer: MEDICARE

## 2025-05-01 NOTE — TELEPHONE ENCOUNTER
I called patient. She states the pharmacy filled Amlodipine 10mg. She states she has been taking it, I advised do not take any more at this time. Yesterday her blood pressure was 100/60, she has not taken the medication today, blood pressure this morning is 136/76.     I called Jennifer spoke with the Pharmacist Bruna, she states the prescription states 5mg but someone put it in as 10mg. She advised to have the patient return to the pharmacy and they will dispense the correct dose.     I called Dodie back and advised to take the bottle back to the pharmacy and they will fill the correct dose. She will return today to the pharmacy. Advised once she receives the correct prescription to start taking it again. Advised her to continue to monitor blood pressure.

## 2025-05-01 NOTE — TELEPHONE ENCOUNTER
Caller: Dodie Kelly    Relationship: Self    Best call back number: [phone]     Which medication are you concerned about: AMLODIPINE 10 MG (ON BOTTLE) BUT WE PRESCRIBED 5 MG AS SHOWN ON HER CHART.    Who prescribed you this medication: DR RODAS    When did you start taking this medication: 4/18/25    What are your concerns: LOW BP /60    How long have you had these concerns: 4/18/25.  I WT TO  DUE TO LOW BP AND PHARMACY DOSAGE MISTAKE.

## 2025-06-03 ENCOUNTER — HOSPITAL ENCOUNTER (OUTPATIENT)
Dept: MAMMOGRAPHY | Facility: HOSPITAL | Age: 70
Discharge: HOME | End: 2025-06-03
Admitting: SURGERY
Payer: MEDICARE

## 2025-06-03 DIAGNOSIS — Z12.31 VISIT FOR SCREENING MAMMOGRAM: ICD-10-CM

## 2025-06-03 DIAGNOSIS — D05.12 BREAST NEOPLASM, TIS (DCIS), LEFT: ICD-10-CM

## 2025-06-03 PROCEDURE — 77066 DX MAMMO INCL CAD BI: CPT | Performed by: RADIOLOGY

## 2025-06-03 PROCEDURE — G0279 TOMOSYNTHESIS, MAMMO: HCPCS | Performed by: RADIOLOGY

## 2025-06-03 PROCEDURE — 77066 DX MAMMO INCL CAD BI: CPT

## 2025-06-03 PROCEDURE — G0279 TOMOSYNTHESIS, MAMMO: HCPCS

## 2025-07-03 ENCOUNTER — OFFICE VISIT (OUTPATIENT)
Dept: SURGERY | Facility: CLINIC | Age: 70
End: 2025-07-03
Payer: MEDICARE

## 2025-07-03 VITALS
BODY MASS INDEX: 21.99 KG/M2 | WEIGHT: 132 LBS | DIASTOLIC BLOOD PRESSURE: 62 MMHG | SYSTOLIC BLOOD PRESSURE: 108 MMHG | HEART RATE: 60 BPM | HEIGHT: 65 IN

## 2025-07-03 DIAGNOSIS — D05.12 BREAST NEOPLASM, TIS (DCIS), LEFT: Primary | ICD-10-CM

## 2025-07-03 DIAGNOSIS — Z78.0 POST-MENOPAUSE: Primary | ICD-10-CM

## 2025-07-03 DIAGNOSIS — M85.851 OSTEOPENIA OF NECK OF RIGHT FEMUR: ICD-10-CM

## 2025-07-03 NOTE — PROGRESS NOTES
Subjective   Dodie Kelly is a 69 y.o. female here today for mammogram follow up.    History of Present Illness   was seen in the office today for breast cancer follow-up.  She is status post a left lumpectomy for DCIS on 10/12/2021.  Patient completed a course of radiation.    She declined hormonal therapy.  Ms. Kelly presents to the office today having had a bilateral mammogram with tomosynthesis on 6/3/2025.  Which demonstrated no changes over prior.    She denies any palpable abnormalities in the breast or the axilla.  She denies any arm edema.  She denies any changes in her health.   Bone density was performed on 5/1/24 which demonstrated osteopenia with a T score of -2.0.  Patient states she is taking calcium and vitamin D  Allergies   Allergen Reactions    Bactrim [Sulfamethoxazole-Trimethoprim] Other (See Comments)     Passed out    Lisinopril Cough    Losartan Rash       Current Outpatient Medications   Medication Sig Dispense Refill    amLODIPine (NORVASC) 5 MG tablet Take 1 tablet by mouth Daily. 90 tablet 1    calcium carbonate (OS-ALB[place]IA) 600 MG tablet Take 1 tablet by mouth Daily. 2 a day      omeprazole (priLOSEC) 20 MG capsule Take 1 capsule by mouth Daily. 90 capsule 1    Vitamin D, Cholecalciferol, (CHOLECALCIFEROL) 10 MCG (400 UNIT) tablet Take 1 tablet by mouth Daily.      bisacodyl (Dulcolax) 5 MG EC tablet Take 1 tablet by mouth Daily As Needed for Constipation. TAKE 4 TABLETS AT 12PM (Patient not taking: Reported on 7/3/2025) 4 tablet 0    polyethylene glycol (MiraLax) 17 GM/SCOOP powder Take 17 g by mouth Daily. TAKE 15 CAPFULS MIXED IN 32 OZ OF LIQUID AT 4PM AND 6PM (Patient not taking: Reported on 7/3/2025) 510 g 0     No current facility-administered medications for this visit.     Past Medical History:   Diagnosis Date    Breast cancer 2021    left    Cancer     breast    GERD (gastroesophageal reflux disease)     Hypertension      Past Surgical History:   Procedure Laterality  "Date    BREAST BIOPSY Left 10/12/2021    Procedure: BREAST BIOPSY WITH NEEDLE LOCALIZATION;  Surgeon: Estefania Dias MD;  Location: [facility] OR;  Service: General;  Laterality: Left;    BREAST LUMPECTOMY Left 10/12/2021    [place]    COLONOSCOPY         Pertinent Review of Systems    Objective   /62 (BP Location: Left arm)   Pulse 60   Ht 165.1 cm (65\")   Wt 59.9 kg (132 lb)   BMI 21.97 kg/m²    Physical Exam  Well-developed well-nourished female no acute distress.  Neck:  No supraclavicular adenopathy  Lungs:  Respiratory effort normal. Auscultation: Clear, without wheezes, rhonchi, rales  Heart:  Regular rate and rhythm, without murmur, gallop, rub.  No pedal edema  Breasts: On visual inspection the breasts are symmetrical.  Examination of the right breast demonstrates no discrete mass, skin change, or axillary adenopathy.  Examination of the left breast demonstrates a healed surgical scar in the upper outer quadrant.  The tissue is dense.  There is no palpable mass or adenopathy..   I have reviewed the copied text and it is accurate as of 7/3/2025   Procedures     Results/Data:  Imaging: Mammogram reports and images from 6/3/2025 were reviewed.  I agree with the assessment      Assessment & Plan   DCIS of the left breast, ER positive, SD positive.  Status post lumpectomy with radiation  Patient declined hormonal therapy.  Osteopenia, slightly worse over prior     Plan: Bilateral mammogram and bone density in June, 2026 with return to the office following in        Discussion/Summary:    Time spent:     BMI is within normal parameters. No other follow-up for BMI required.       @[email or web address]    This document has been electronically signed by        July 3, 2025 10:51 EDT    Please note that portions of this note were completed with a voice recognition program.  "

## 2025-07-15 RX ORDER — MAGNESIUM GLUCONATE 27 MG(500)
500 TABLET ORAL ONCE
COMMUNITY

## 2025-07-16 ENCOUNTER — ANESTHESIA (OUTPATIENT)
Dept: PERIOP | Facility: HOSPITAL | Age: 70
End: 2025-07-16
Payer: MEDICARE

## 2025-07-16 ENCOUNTER — ANESTHESIA EVENT (OUTPATIENT)
Dept: PERIOP | Facility: HOSPITAL | Age: 70
End: 2025-07-16
Payer: MEDICARE

## 2025-07-16 ENCOUNTER — HOSPITAL ENCOUNTER (OUTPATIENT)
Facility: HOSPITAL | Age: 70
Setting detail: HOSPITAL OUTPATIENT SURGERY
Discharge: HOME | End: 2025-07-16
Attending: INTERNAL MEDICINE | Admitting: INTERNAL MEDICINE
Payer: MEDICARE

## 2025-07-16 VITALS
DIASTOLIC BLOOD PRESSURE: 90 MMHG | TEMPERATURE: 98 F | BODY MASS INDEX: 21.16 KG/M2 | HEART RATE: 62 BPM | OXYGEN SATURATION: 97 % | WEIGHT: 127 LBS | RESPIRATION RATE: 18 BRPM | HEIGHT: 65 IN | SYSTOLIC BLOOD PRESSURE: 135 MMHG

## 2025-07-16 DIAGNOSIS — K63.5 COLORECTAL POLYP DETECTED ON COLONOSCOPY: ICD-10-CM

## 2025-07-16 DIAGNOSIS — Z12.11 ENCOUNTER FOR SCREENING FOR MALIGNANT NEOPLASM OF COLON: ICD-10-CM

## 2025-07-16 PROCEDURE — 25810000003 LACTATED RINGERS PER 1000 ML: Performed by: ANESTHESIOLOGY

## 2025-07-16 PROCEDURE — 25010000002 PROPOFOL 200 MG/20ML EMULSION: Performed by: NURSE ANESTHETIST, CERTIFIED REGISTERED

## 2025-07-16 PROCEDURE — G0105 COLORECTAL SCRN; HI RISK IND: HCPCS | Performed by: INTERNAL MEDICINE

## 2025-07-16 PROCEDURE — 25010000002 LIDOCAINE PF 2% 2 % SOLUTION: Performed by: NURSE ANESTHETIST, CERTIFIED REGISTERED

## 2025-07-16 RX ORDER — MIDAZOLAM HYDROCHLORIDE 1 MG/ML
0.5 INJECTION, SOLUTION INTRAMUSCULAR; INTRAVENOUS
Status: DISCONTINUED | OUTPATIENT
Start: 2025-07-16 | End: 2025-07-16 | Stop reason: HOSPADM

## 2025-07-16 RX ORDER — SODIUM CHLORIDE 0.9 % (FLUSH) 0.9 %
10 SYRINGE (ML) INJECTION EVERY 12 HOURS SCHEDULED
Status: DISCONTINUED | OUTPATIENT
Start: 2025-07-16 | End: 2025-07-16 | Stop reason: HOSPADM

## 2025-07-16 RX ORDER — SODIUM CHLORIDE, SODIUM LACTATE, POTASSIUM CHLORIDE, CALCIUM CHLORIDE 600; 310; 30; 20 MG/100ML; MG/100ML; MG/100ML; MG/100ML
100 INJECTION, SOLUTION INTRAVENOUS ONCE AS NEEDED
Status: DISCONTINUED | OUTPATIENT
Start: 2025-07-16 | End: 2025-07-16 | Stop reason: HOSPADM

## 2025-07-16 RX ORDER — SODIUM CHLORIDE 9 MG/ML
40 INJECTION, SOLUTION INTRAVENOUS AS NEEDED
Status: DISCONTINUED | OUTPATIENT
Start: 2025-07-16 | End: 2025-07-16 | Stop reason: HOSPADM

## 2025-07-16 RX ORDER — LIDOCAINE HYDROCHLORIDE 20 MG/ML
INJECTION, SOLUTION EPIDURAL; INFILTRATION; INTRACAUDAL; PERINEURAL AS NEEDED
Status: DISCONTINUED | OUTPATIENT
Start: 2025-07-16 | End: 2025-07-16 | Stop reason: SURG

## 2025-07-16 RX ORDER — MEPERIDINE HYDROCHLORIDE 25 MG/ML
12.5 INJECTION INTRAMUSCULAR; INTRAVENOUS; SUBCUTANEOUS
Status: DISCONTINUED | OUTPATIENT
Start: 2025-07-16 | End: 2025-07-16 | Stop reason: HOSPADM

## 2025-07-16 RX ORDER — ONDANSETRON 2 MG/ML
4 INJECTION INTRAMUSCULAR; INTRAVENOUS AS NEEDED
Status: DISCONTINUED | OUTPATIENT
Start: 2025-07-16 | End: 2025-07-16 | Stop reason: HOSPADM

## 2025-07-16 RX ORDER — SODIUM CHLORIDE, SODIUM LACTATE, POTASSIUM CHLORIDE, CALCIUM CHLORIDE 600; 310; 30; 20 MG/100ML; MG/100ML; MG/100ML; MG/100ML
125 INJECTION, SOLUTION INTRAVENOUS ONCE
Status: COMPLETED | OUTPATIENT
Start: 2025-07-16 | End: 2025-07-16

## 2025-07-16 RX ORDER — FENTANYL CITRATE 50 UG/ML
50 INJECTION, SOLUTION INTRAMUSCULAR; INTRAVENOUS
Status: DISCONTINUED | OUTPATIENT
Start: 2025-07-16 | End: 2025-07-16 | Stop reason: HOSPADM

## 2025-07-16 RX ORDER — SODIUM CHLORIDE 0.9 % (FLUSH) 0.9 %
10 SYRINGE (ML) INJECTION AS NEEDED
Status: DISCONTINUED | OUTPATIENT
Start: 2025-07-16 | End: 2025-07-16 | Stop reason: HOSPADM

## 2025-07-16 RX ORDER — OXYCODONE AND ACETAMINOPHEN 5; 325 MG/1; MG/1
1 TABLET ORAL ONCE AS NEEDED
Status: DISCONTINUED | OUTPATIENT
Start: 2025-07-16 | End: 2025-07-16 | Stop reason: HOSPADM

## 2025-07-16 RX ORDER — PROPOFOL 10 MG/ML
INJECTION, EMULSION INTRAVENOUS AS NEEDED
Status: DISCONTINUED | OUTPATIENT
Start: 2025-07-16 | End: 2025-07-16 | Stop reason: SURG

## 2025-07-16 RX ORDER — IPRATROPIUM BROMIDE AND ALBUTEROL SULFATE 2.5; .5 MG/3ML; MG/3ML
3 SOLUTION RESPIRATORY (INHALATION) ONCE AS NEEDED
Status: DISCONTINUED | OUTPATIENT
Start: 2025-07-16 | End: 2025-07-16 | Stop reason: HOSPADM

## 2025-07-16 RX ADMIN — SODIUM CHLORIDE, POTASSIUM CHLORIDE, SODIUM LACTATE AND CALCIUM CHLORIDE: 600; 310; 30; 20 INJECTION, SOLUTION INTRAVENOUS at 13:20

## 2025-07-16 RX ADMIN — PROPOFOL 50 MG: 10 INJECTION, EMULSION INTRAVENOUS at 13:35

## 2025-07-16 RX ADMIN — PROPOFOL 50 MG: 10 INJECTION, EMULSION INTRAVENOUS at 13:26

## 2025-07-16 RX ADMIN — LIDOCAINE HYDROCHLORIDE 60 MG: 20 INJECTION, SOLUTION EPIDURAL; INFILTRATION; INTRACAUDAL; PERINEURAL at 13:22

## 2025-07-16 RX ADMIN — PROPOFOL 50 MG: 10 INJECTION, EMULSION INTRAVENOUS at 13:22

## 2025-07-16 RX ADMIN — PROPOFOL 50 MG: 10 INJECTION, EMULSION INTRAVENOUS at 13:30

## 2025-07-16 NOTE — H&P
[facility]    Patient Identification:  Name:  Dodie Kelly  Age:  69 y.o.  Sex:  female  :  1955  MRN:  [id number]   Visit Number:  [id number]  Primary Care Physician:  Fany Wilson DO       Chief complaint: History of colon polyps    History of presenting illness:  69 y.o. female who presents today for colonoscopy.  Patient reports most recent colonoscopy performed 5 years ago by Dr. Ervin.  This was notable for several polyps.  She is not sure if these were benign or precancerous.  She has a family history of colon cancer in her maternal grandmother.  She denies unintentional weight loss, nausea, vomiting, abdominal pain, changes in bowel habits, melena, hematochezia, or BRBPR.    Review of Systems   Constitutional: Negative.    HENT: Negative.     Respiratory: Negative.     Cardiovascular: Negative.    Gastrointestinal: Negative.    All other systems reviewed and are negative.       Past Medical History:   Diagnosis Date    Arthritis     Breast cancer     left    Cancer     breast    Elevated cholesterol     GERD (gastroesophageal reflux disease)     Hypertension      Past Surgical History:   Procedure Laterality Date    BREAST BIOPSY Left 10/12/2021    Procedure: BREAST BIOPSY WITH NEEDLE LOCALIZATION;  Surgeon: Estefania Dias MD;  Location: [facility];  Service: General;  Laterality: Left;    BREAST LUMPECTOMY Left 10/12/2021    ca    COLONOSCOPY       Family History   Problem Relation Age of Onset    Hypertension Mother     Heart disease Father     Hypertension Sister     Hypertension Daughter     Cancer Maternal Grandmother         colon    Cancer Brother     Breast cancer Neg Hx      Social History     Socioeconomic History    Marital status:    Tobacco Use    Smoking status: Never    Smokeless tobacco: Never   Vaping Use    Vaping status: Never Used   Substance and Sexual Activity    Alcohol use: Never    Drug use: Never     Sexual activity: Defer     Partners: Male       Allergies:  Bactrim [sulfamethoxazole-trimethoprim], Lisinopril, and Losartan    Prior to Admission Medications       Prescriptions Last Dose Informant Patient Reported? Taking?    amLODIPine (NORVASC) 5 MG tablet 7/16/2025  No Yes    Take 1 tablet by mouth Daily.    bisacodyl (Dulcolax) 5 MG EC tablet 7/15/2025  No Yes    Take 1 tablet by mouth Daily As Needed for Constipation. TAKE 4 TABLETS AT 12PM    calcium carbonate (OS-[place]) 600 MG tablet 7/15/2025 Self Yes Yes    Take 1 tablet by mouth Daily. 2 a day    magnesium gluconate (MAGONATE) 500 MG tablet 7/15/2025  Yes Yes    Take 1 tablet by mouth 1 time.    omeprazole (priLOSEC) 20 MG capsule 7/15/2025  No Yes    Take 1 capsule by mouth Daily.    polyethylene glycol (MiraLax) 17 GM/SCOOP powder 7/15/2025  No Yes    Take 17 g by mouth Daily. TAKE 15 CAPFULS MIXED IN 32 OZ OF LIQUID AT 4PM AND 6PM    Vitamin D, Cholecalciferol, (CHOLECALCIFEROL) 10 MCG (400 UNIT) tablet 7/15/2025  Yes Yes    Take 1 tablet by mouth Daily.          Hospital Scheduled Meds:  lactated ringers, 125 mL/hr, Intravenous, Once  sodium chloride, 10 mL, Intravenous, Q12H           Vital Signs:  Temp:  [98.2 °F (36.8 °C)] 98.2 °F (36.8 °C)  Heart Rate:  [61] 61  Resp:  [18] 18  BP: (139)/(76) 139/76      07/16/25  1140   Weight: 57.6 kg (127 lb)     Body mass index is 21.13 kg/m².    Physical Exam:  Constitutional:  Alert and oriented. Well developed and well nourished, in no acute distress.  HENT:  Head: Normocephalic and atraumatic.  Mouth:  Moist mucous membranes.  OP clear, mmm  Eyes:  Conjunctivae and EOM are normal.  Pupils are equal, round, and reactive to light.  No scleral icterus.  Neck:  Neck supple.  No JVD present.    Cardiovascular:  RRR, no MRG.  Pulmonary/Chest:  CTAB, unlabored.   Abdominal:  Soft.  Bowel sounds are normal.  No distension and no tenderness.   Psychiatric:  Normal mood and affect.  Behavior is normal.  Judgment and  "thought content normal.                     Invalid input(s): \"PROT\"CrCl cannot be calculated (Patient's most recent lab result is older than the maximum 30 days allowed.).  No results found for: \"AMMONIA\"          No results found for: \"HGBA1C\"  Lab Results   Component Value Date    TSH 1.070 10/17/2024     No results found for: \"PREGTESTUR\", \"PREGSERUM\", \"HCG\", \"HCGQUANT\"  Pain Management Panel           No data to display              No results found for: \"BLOODCX\"  No results found for: \"URINECX\"  No results found for: \"WOUNDCX\"  No results found for: \"STOOLCX\"        Imaging Results (Last 7 Days)       ** No results found for the last 168 hours. **              Assessment and Plan:    Proceed with colonoscopy due to personal history of colon polyps.    Deborah Zavala PA-C  07/16/25  12:27 EDT    "

## 2025-07-16 NOTE — ANESTHESIA POSTPROCEDURE EVALUATION
Patient: Dodie Kelly    Procedure Summary       Date: 07/16/25 Room / Location:  [facility]  /  [facility]    Anesthesia Start: 1320 Anesthesia Stop: 1341    Procedure: COLONOSCOPY Diagnosis:       Encounter for screening for malignant neoplasm of colon      Colorectal polyp detected on colonoscopy      (Encounter for screening for malignant neoplasm of colon [Z12.11])      (Colorectal polyp detected on colonoscopy [K63.5])    Surgeons: Claudia Olvera MD Provider: Shukri Solorio DO    Anesthesia Type: general ASA Status: 2            Anesthesia Type: general    Vitals  Vitals Value Taken Time   /90 07/16/25 14:12   Temp 98 °F (36.7 °C) 07/16/25 13:42   Pulse 62 07/16/25 14:12   Resp 18 07/16/25 14:12   SpO2 97 % 07/16/25 14:12           Post Anesthesia Care and Evaluation    Patient location during evaluation: PHASE II  Patient participation: complete - patient participated  Level of consciousness: awake and alert  Pain score: 1  Pain management: adequate    Airway patency: patent  Anesthetic complications: No anesthetic complications  PONV Status: controlled  Cardiovascular status: acceptable  Respiratory status: acceptable  Hydration status: acceptable

## (undated) DEVICE — KT CVR ULTRASND PROB PULL UP LXF 5X8

## (undated) DEVICE — PK BASIC 70

## (undated) DEVICE — AMD ANTIMICROBIAL NON-ADHERENT ISLAND DRESSING,0.2% POLYHEXAMETHYLENE BIGUANIDE HCI (PHMB): Brand: TELFA

## (undated) DEVICE — ENDOGATOR AUXILIARY WATER JET CONNECTOR: Brand: ENDOGATOR

## (undated) DEVICE — ELECTRD NDL MEGADYNE EZCLEAN NOSE 7CM

## (undated) DEVICE — DEV TRNSPEC

## (undated) DEVICE — UNDYED BRAIDED (POLYGLACTIN 910), SYNTHETIC ABSORBABLE SUTURE: Brand: COATED VICRYL

## (undated) DEVICE — PATIENT RETURN ELECTRODE, SINGLE-USE, CONTACT QUALITY MONITORING, ADULT, WITH 9FT CORD, FOR PATIENTS WEIGING OVER 33LBS. (15KG): Brand: MEGADYNE

## (undated) DEVICE — VIOLET BRAIDED (POLYGLACTIN 910), SYNTHETIC ABSORBABLE SUTURE: Brand: COATED VICRYL

## (undated) DEVICE — SUT MNCRYL PLS ANTIB UD 4/0 PS2 18IN

## (undated) DEVICE — DRP SURG UNIV BASIC BILAMINATE 53X77IN DISP

## (undated) DEVICE — SPNG LAP PREWSH SFTPK 18X18IN STRL PK/5

## (undated) DEVICE — DRP UTIL 2/LAYR W/TP 15X26IN STRL PK/4

## (undated) DEVICE — Device

## (undated) DEVICE — GLV SURG PREMIERPRO MIC LTX PF SZ8 BRN

## (undated) DEVICE — ENDOGATOR TUBING FOR ENDOGATOR EGP-100 IRRIGATION PUMP,OLYMPUS OFP PUMP, OLYMPUS AFU-100 PUMP AND ERBE EIP2 PUMP: Brand: ENDOGATOR

## (undated) DEVICE — GLV SURG SENSICARE W/ALOE PF LF 7.5 STRL

## (undated) DEVICE — HOLDER: Brand: DEROYAL

## (undated) DEVICE — APPL CHLORAPREP HI/LITE 26ML ORNG

## (undated) DEVICE — SUT VIC 3/0 SH 27IN J416H

## (undated) DEVICE — SUT SILK 2/0 SH 30IN K833H

## (undated) DEVICE — CONN Y IRR DISP 1P/U

## (undated) DEVICE — DRAPE,T,LAPARO,TRANS,STERILE: Brand: MEDLINE

## (undated) DEVICE — PENCL ES MEGADINE EZ/CLEAN BUTN W/HOLSTR 10FT

## (undated) DEVICE — BRA COMPR CURAD P/OP LG

## (undated) DEVICE — DEFENDO AIR WATER SUCTION AND BIOPSY VALVE KIT FOR  OLYMPUS: Brand: DEFENDO AIR/WATER/SUCTION AND BIOPSY VALVE

## (undated) DEVICE — KT INK TISS MARGINMARKER STD 6COLOR